# Patient Record
Sex: MALE | Race: AMERICAN INDIAN OR ALASKA NATIVE | Employment: FULL TIME | URBAN - METROPOLITAN AREA
[De-identification: names, ages, dates, MRNs, and addresses within clinical notes are randomized per-mention and may not be internally consistent; named-entity substitution may affect disease eponyms.]

---

## 2017-01-09 LAB — INR: 2.9 (ref 0.8–1.2)

## 2017-01-10 ENCOUNTER — ANTI-COAG VISIT (OUTPATIENT)
Dept: HEMATOLOGY/ONCOLOGY | Facility: HOSPITAL | Age: 62
End: 2017-01-10

## 2017-01-10 DIAGNOSIS — Z79.01 LONG TERM (CURRENT) USE OF ANTICOAGULANTS: Primary | ICD-10-CM

## 2017-01-17 ENCOUNTER — ANTI-COAG VISIT (OUTPATIENT)
Dept: HEMATOLOGY/ONCOLOGY | Facility: HOSPITAL | Age: 62
End: 2017-01-17

## 2017-01-17 DIAGNOSIS — Z79.01 LONG TERM (CURRENT) USE OF ANTICOAGULANTS: Primary | ICD-10-CM

## 2017-01-17 LAB — INR: 2.8 (ref 0.8–1.2)

## 2017-01-30 ENCOUNTER — ANTI-COAG VISIT (OUTPATIENT)
Dept: HEMATOLOGY/ONCOLOGY | Facility: HOSPITAL | Age: 62
End: 2017-01-30

## 2017-01-30 DIAGNOSIS — Z79.01 LONG TERM (CURRENT) USE OF ANTICOAGULANTS: Primary | ICD-10-CM

## 2017-01-30 LAB — INR: 2.6 (ref 0.8–1.2)

## 2017-02-13 ENCOUNTER — ANTI-COAG VISIT (OUTPATIENT)
Dept: HEMATOLOGY/ONCOLOGY | Facility: HOSPITAL | Age: 62
End: 2017-02-13
Payer: COMMERCIAL

## 2017-02-13 DIAGNOSIS — I26.99 BILATERAL PULMONARY EMBOLISM (HCC): ICD-10-CM

## 2017-02-13 DIAGNOSIS — Z79.01 LONG TERM (CURRENT) USE OF ANTICOAGULANTS: Primary | ICD-10-CM

## 2017-02-13 LAB — INR: 2.9 (ref 0.8–1.2)

## 2017-02-13 PROCEDURE — 99211 OFF/OP EST MAY X REQ PHY/QHP: CPT

## 2017-02-21 LAB — INR: 2.9 (ref 0.8–1.2)

## 2017-02-22 ENCOUNTER — ANTI-COAG VISIT (OUTPATIENT)
Dept: HEMATOLOGY/ONCOLOGY | Facility: HOSPITAL | Age: 62
End: 2017-02-22

## 2017-02-22 DIAGNOSIS — Z79.01 LONG TERM (CURRENT) USE OF ANTICOAGULANTS: Primary | ICD-10-CM

## 2017-02-27 LAB — INR: 2.8 (ref 0.8–1.2)

## 2017-03-03 ENCOUNTER — ANTI-COAG VISIT (OUTPATIENT)
Dept: HEMATOLOGY/ONCOLOGY | Facility: HOSPITAL | Age: 62
End: 2017-03-03

## 2017-03-03 DIAGNOSIS — Z79.01 LONG TERM (CURRENT) USE OF ANTICOAGULANTS: Primary | ICD-10-CM

## 2017-03-06 ENCOUNTER — ANTI-COAG VISIT (OUTPATIENT)
Dept: HEMATOLOGY/ONCOLOGY | Facility: HOSPITAL | Age: 62
End: 2017-03-06

## 2017-03-06 DIAGNOSIS — Z79.01 LONG TERM (CURRENT) USE OF ANTICOAGULANTS: Primary | ICD-10-CM

## 2017-03-06 LAB — INR: 2.6 (ref 0.8–1.2)

## 2017-03-13 ENCOUNTER — ANTI-COAG VISIT (OUTPATIENT)
Dept: HEMATOLOGY/ONCOLOGY | Facility: HOSPITAL | Age: 62
End: 2017-03-13

## 2017-03-13 DIAGNOSIS — Z79.01 LONG TERM (CURRENT) USE OF ANTICOAGULANTS: Primary | ICD-10-CM

## 2017-03-13 LAB — INR: 2.8 (ref 0.8–1.2)

## 2017-03-17 ENCOUNTER — OFFICE VISIT (OUTPATIENT)
Dept: FAMILY MEDICINE CLINIC | Facility: CLINIC | Age: 62
End: 2017-03-17

## 2017-03-17 ENCOUNTER — LAB ENCOUNTER (OUTPATIENT)
Dept: LAB | Age: 62
End: 2017-03-17
Attending: FAMILY MEDICINE
Payer: COMMERCIAL

## 2017-03-17 VITALS
RESPIRATION RATE: 20 BRPM | BODY MASS INDEX: 37.51 KG/M2 | HEART RATE: 84 BPM | TEMPERATURE: 99 F | SYSTOLIC BLOOD PRESSURE: 122 MMHG | HEIGHT: 70 IN | WEIGHT: 262 LBS | DIASTOLIC BLOOD PRESSURE: 80 MMHG

## 2017-03-17 DIAGNOSIS — N20.0 KIDNEY STONES: ICD-10-CM

## 2017-03-17 DIAGNOSIS — Z12.11 COLON CANCER SCREENING: ICD-10-CM

## 2017-03-17 DIAGNOSIS — G47.33 OSA ON CPAP: ICD-10-CM

## 2017-03-17 DIAGNOSIS — Z00.00 ANNUAL PHYSICAL EXAM: ICD-10-CM

## 2017-03-17 DIAGNOSIS — Z15.89 MTHFR MUTATION: ICD-10-CM

## 2017-03-17 DIAGNOSIS — Z99.89 OSA ON CPAP: ICD-10-CM

## 2017-03-17 DIAGNOSIS — K42.0 UMBILICAL HERNIA WITH OBSTRUCTION, WITHOUT GANGRENE: ICD-10-CM

## 2017-03-17 DIAGNOSIS — K57.30 DIVERTICULOSIS OF LARGE INTESTINE WITHOUT HEMORRHAGE: ICD-10-CM

## 2017-03-17 DIAGNOSIS — Z00.00 ANNUAL PHYSICAL EXAM: Primary | ICD-10-CM

## 2017-03-17 DIAGNOSIS — I26.99 RECURRENT PULMONARY EMBOLISM (HCC): ICD-10-CM

## 2017-03-17 DIAGNOSIS — N40.0 BENIGN NON-NODULAR PROSTATIC HYPERPLASIA, PRESENCE OF LOWER URINARY TRACT SYMPTOMS UNSPECIFIED: ICD-10-CM

## 2017-03-17 DIAGNOSIS — R19.09 GROIN MASS: ICD-10-CM

## 2017-03-17 LAB
25-HYDROXYVITAMIN D (TOTAL): 23.4 NG/ML (ref 30–100)
ALBUMIN SERPL-MCNC: 4 G/DL (ref 3.5–4.8)
ALP LIVER SERPL-CCNC: 102 U/L (ref 45–117)
ALT SERPL-CCNC: 95 U/L (ref 17–63)
AST SERPL-CCNC: 37 U/L (ref 15–41)
BASOPHILS # BLD AUTO: 0.05 X10(3) UL (ref 0–0.1)
BASOPHILS NFR BLD AUTO: 1 %
BILIRUB SERPL-MCNC: 1.5 MG/DL (ref 0.1–2)
BUN BLD-MCNC: 13 MG/DL (ref 8–20)
CALCIUM BLD-MCNC: 10 MG/DL (ref 8.3–10.3)
CHLORIDE: 105 MMOL/L (ref 101–111)
CHOLEST SMN-MCNC: 204 MG/DL (ref ?–200)
CO2: 28 MMOL/L (ref 22–32)
CREAT BLD-MCNC: 1.19 MG/DL (ref 0.7–1.3)
EOSINOPHIL # BLD AUTO: 0.33 X10(3) UL (ref 0–0.3)
EOSINOPHIL NFR BLD AUTO: 6.5 %
ERYTHROCYTE [DISTWIDTH] IN BLOOD BY AUTOMATED COUNT: 15 % (ref 11.5–16)
FREE T4: 1.1 NG/DL (ref 0.9–1.8)
GLUCOSE BLD-MCNC: 99 MG/DL (ref 70–99)
HAV AB SERPL IA-ACNC: 656 PG/ML (ref 193–986)
HAV IGM SER QL: 2.2 MG/DL (ref 1.7–3)
HBV SURFACE AB SER QL: NONREACTIVE
HBV SURFACE AB SERPL IA-ACNC: <3.1 MIU/ML
HCT VFR BLD AUTO: 48.1 % (ref 37–53)
HDLC SERPL-MCNC: 43 MG/DL (ref 45–?)
HDLC SERPL: 4.74 {RATIO} (ref ?–4.97)
HGB BLD-MCNC: 16.1 G/DL (ref 13–17)
IMMATURE GRANULOCYTE COUNT: 0.01 X10(3) UL (ref 0–1)
IMMATURE GRANULOCYTE RATIO %: 0.2 %
LDLC SERPL CALC-MCNC: 128 MG/DL (ref ?–130)
LYMPHOCYTES # BLD AUTO: 1.77 X10(3) UL (ref 0.9–4)
LYMPHOCYTES NFR BLD AUTO: 34.8 %
M PROTEIN MFR SERPL ELPH: 8.3 G/DL (ref 6.1–8.3)
MCH RBC QN AUTO: 30.1 PG (ref 27–33.2)
MCHC RBC AUTO-ENTMCNC: 33.5 G/DL (ref 31–37)
MCV RBC AUTO: 89.9 FL (ref 80–99)
MONOCYTES # BLD AUTO: 0.4 X10(3) UL (ref 0.1–0.6)
MONOCYTES NFR BLD AUTO: 7.9 %
NEUTROPHIL ABS PRELIM: 2.52 X10 (3) UL (ref 1.3–6.7)
NEUTROPHILS # BLD AUTO: 2.52 X10(3) UL (ref 1.3–6.7)
NEUTROPHILS NFR BLD AUTO: 49.6 %
NONHDLC SERPL-MCNC: 161 MG/DL (ref ?–130)
PLATELET # BLD AUTO: 100 10(3)UL (ref 150–450)
POTASSIUM SERPL-SCNC: 4.6 MMOL/L (ref 3.6–5.1)
PSA SERPL-MCNC: 2.98 NG/ML (ref 0.01–4)
RBC # BLD AUTO: 5.35 X10(6)UL (ref 4.3–5.7)
RED CELL DISTRIBUTION WIDTH-SD: 49 FL (ref 35.1–46.3)
SODIUM SERPL-SCNC: 140 MMOL/L (ref 136–144)
TRIGLYCERIDES: 167 MG/DL (ref ?–150)
TSI SER-ACNC: 1.21 MIU/ML (ref 0.35–5.5)
VLDL: 33 MG/DL (ref 5–40)
WBC # BLD AUTO: 5.1 X10(3) UL (ref 4–13)

## 2017-03-17 PROCEDURE — 82607 VITAMIN B-12: CPT

## 2017-03-17 PROCEDURE — 99386 PREV VISIT NEW AGE 40-64: CPT | Performed by: FAMILY MEDICINE

## 2017-03-17 PROCEDURE — 84443 ASSAY THYROID STIM HORMONE: CPT

## 2017-03-17 PROCEDURE — 80053 COMPREHEN METABOLIC PANEL: CPT

## 2017-03-17 PROCEDURE — 80061 LIPID PANEL: CPT

## 2017-03-17 PROCEDURE — 82306 VITAMIN D 25 HYDROXY: CPT

## 2017-03-17 PROCEDURE — 36415 COLL VENOUS BLD VENIPUNCTURE: CPT

## 2017-03-17 PROCEDURE — 83735 ASSAY OF MAGNESIUM: CPT

## 2017-03-17 PROCEDURE — 85025 COMPLETE CBC W/AUTO DIFF WBC: CPT

## 2017-03-17 PROCEDURE — 86706 HEP B SURFACE ANTIBODY: CPT

## 2017-03-17 PROCEDURE — 84439 ASSAY OF FREE THYROXINE: CPT

## 2017-03-17 PROCEDURE — 84153 ASSAY OF PSA TOTAL: CPT

## 2017-03-17 RX ORDER — TAMSULOSIN HYDROCHLORIDE 0.4 MG/1
0.4 CAPSULE ORAL DAILY
Qty: 90 CAPSULE | Refills: 0 | Status: SHIPPED | OUTPATIENT
Start: 2017-03-17 | End: 2017-06-13

## 2017-03-17 NOTE — PROGRESS NOTES
Melissa Moura is a 65year old male who presents for a complete physical exam.   HPI:   Pt complains of nothing.     Recurrent pulmonary embolism    + calcium and vit D   + nocturia urinary symptoms  No erectile dysfunction  No penile discharge  + LEANDRA on lesions  EYES:denies blurred vision or double visio  HEENT: denies nasal congestion, sinus pain or ST  LUNGS: denies shortness of breath with exertion  CARDIOVASCULAR: denies chest pain on exertion  GI: denies abdominal pain,denies heartburn  : denies no Future    2. Kidney stones  Push hydration     3. Diverticulosis of large intestine without hemorrhage  Due for repeat c-scope     4. Recurrent pulmonary embolism (HCC)  Sees heme     5. MTHFR mutation ([address]  On coumadin     6. LEANDRA on CPAP      7.  Colon ca

## 2017-03-20 DIAGNOSIS — E56.9 VITAMIN DEFICIENCY: Primary | ICD-10-CM

## 2017-03-20 DIAGNOSIS — R79.89 ABNORMAL CBC: ICD-10-CM

## 2017-03-20 DIAGNOSIS — Z23 NEED FOR HEPATITIS VACCINATION: ICD-10-CM

## 2017-03-20 DIAGNOSIS — R74.8 ELEVATED LIVER ENZYMES: ICD-10-CM

## 2017-03-20 DIAGNOSIS — Z23 NEED FOR HEPATITIS VACCINATION: Primary | ICD-10-CM

## 2017-03-20 DIAGNOSIS — E78.00 ELEVATED CHOLESTEROL: ICD-10-CM

## 2017-03-20 LAB — INR: 2.4 (ref 0.8–1.2)

## 2017-03-21 ENCOUNTER — ANTI-COAG VISIT (OUTPATIENT)
Dept: HEMATOLOGY/ONCOLOGY | Facility: HOSPITAL | Age: 62
End: 2017-03-21

## 2017-03-21 DIAGNOSIS — Z79.01 LONG TERM (CURRENT) USE OF ANTICOAGULANTS: Primary | ICD-10-CM

## 2017-03-24 ENCOUNTER — TELEPHONE (OUTPATIENT)
Dept: FAMILY MEDICINE CLINIC | Facility: CLINIC | Age: 62
End: 2017-03-24

## 2017-03-24 ENCOUNTER — NURSE ONLY (OUTPATIENT)
Dept: FAMILY MEDICINE CLINIC | Facility: CLINIC | Age: 62
End: 2017-03-24

## 2017-03-24 ENCOUNTER — HOSPITAL ENCOUNTER (OUTPATIENT)
Dept: ULTRASOUND IMAGING | Age: 62
Discharge: HOME | End: 2017-03-24
Attending: FAMILY MEDICINE
Payer: COMMERCIAL

## 2017-03-24 DIAGNOSIS — Z23 NEED FOR HEPATITIS B VACCINATION: Primary | ICD-10-CM

## 2017-03-24 DIAGNOSIS — R19.09 GROIN MASS: ICD-10-CM

## 2017-03-24 PROCEDURE — 90746 HEPB VACCINE 3 DOSE ADULT IM: CPT | Performed by: FAMILY MEDICINE

## 2017-03-24 PROCEDURE — 76882 US LMTD JT/FCL EVL NVASC XTR: CPT

## 2017-03-24 PROCEDURE — 90471 IMMUNIZATION ADMIN: CPT | Performed by: FAMILY MEDICINE

## 2017-03-27 ENCOUNTER — ANTI-COAG VISIT (OUTPATIENT)
Dept: HEMATOLOGY/ONCOLOGY | Facility: HOSPITAL | Age: 62
End: 2017-03-27

## 2017-03-27 DIAGNOSIS — Z79.01 LONG TERM (CURRENT) USE OF ANTICOAGULANTS: Primary | ICD-10-CM

## 2017-03-27 LAB — INR: 2.7 (ref 0.8–1.2)

## 2017-04-03 ENCOUNTER — OFFICE VISIT (OUTPATIENT)
Dept: SURGERY | Facility: CLINIC | Age: 62
End: 2017-04-03

## 2017-04-03 VITALS
WEIGHT: 260 LBS | DIASTOLIC BLOOD PRESSURE: 95 MMHG | BODY MASS INDEX: 37.22 KG/M2 | HEIGHT: 70 IN | HEART RATE: 76 BPM | SYSTOLIC BLOOD PRESSURE: 149 MMHG

## 2017-04-03 DIAGNOSIS — K42.9 UMBILICAL HERNIA WITHOUT OBSTRUCTION AND WITHOUT GANGRENE: Primary | ICD-10-CM

## 2017-04-03 DIAGNOSIS — R19.09 MASS OF LEFT INGUINAL REGION: ICD-10-CM

## 2017-04-03 PROCEDURE — 99244 OFF/OP CNSLTJ NEW/EST MOD 40: CPT | Performed by: COLON & RECTAL SURGERY

## 2017-04-03 RX ORDER — POLYETHYLENE GLYCOL 3350, SODIUM CHLORIDE, SODIUM BICARBONATE, POTASSIUM CHLORIDE 420; 11.2; 5.72; 1.48 G/4L; G/4L; G/4L; G/4L
POWDER, FOR SOLUTION ORAL
Qty: 1 BOTTLE | Refills: 0 | Status: SHIPPED | OUTPATIENT
Start: 2017-04-03 | End: 2017-06-12 | Stop reason: ALTCHOICE

## 2017-04-03 NOTE — PATIENT INSTRUCTIONS
The patient is a pleasant 81-year-old male who presents in consultation for a screening colonoscopy as well as a left inguinal mass and umbilical mass. The patient is referred to me by his primary provider, Dr. Andrey Lee.     The patient's last colonoscopy w office. He lives in [place] with his wife. He has no known family history of colon polyps, colon cancer, or uterine cancer. He does have a positive MTHFR gene mutation. Physical exam: The abdomen is soft, nondistended, and nontender.   No rebound   Open Hard Copy Result Report (Order #[id number] - CT ABDOMEN+PELVIS(CPT=74176))       Result Information      Status Provider Status        Final result (8/23/2016 11:59 AM) Reviewed        Study Result         CT ABDOMEN PELVIS WITHOUT CONTRAST     HIS Bilateral nephrolithiasis. 3. Small fat-containing periumbilical hernia with induration of the herniated adipose induration of  the adipose of the cyst of the hernia.  The adipose induration may be a sign of mesenteric  inflammation but please correlate cl

## 2017-04-03 NOTE — H&P
New Patient Visit Note       Active Problems      1. Umbilical hernia without obstruction and without gangrene    2.  Mass of left inguinal region        Chief Complaint   Patient presents with:  Colonoscopy: New patient referred by Dr. Sarah Mcgowan for colonosc patient is referred to me by his primary provider, Dr. Woodrow Sarkar. The patient's last colonoscopy was performed at [facility] in 2007. This was his first colonoscopy. He reports 1 or 2 hyperplastic polyps were removed at that time.   He has not developed any sy MTHFR gene mutation. Allergies  Noemí Rodriguez has No Known Allergies. Past Medical / Surgical / Social / Family History    The past medical and past surgical history have been reviewed by me today.     Past Medical History   Diagnosis Date   • PULMONARY E HENT: Negative for hearing loss, nosebleeds, sore throat and trouble swallowing. Respiratory: Negative for apnea, cough, shortness of breath and wheezing. Cardiovascular: Negative for chest pain, palpitations and leg swelling.    Gastrointestinal: N and oriented to person, place, and time. Skin: Skin is warm and dry. He is not diaphoretic. No erythema. Psychiatric: He has a normal mood and affect.  His behavior is normal. Judgment and thought content normal.           Results    CT ABDOMEN+PELVIS(C just  outside of the hernia. This finding can be acute or chronic. The hernia neck measures 10 mm.     The appendix is normal. Descending colon and sigmoid diverticula are observed with no findings of  active inflammation.  Prostate gland diameter is 5.0 cm        =====  CONCLUSION:  Fat-containing left inguinal hernia.            Dictated by: Prachi Calles MD on 3/24/2017 at 13:09        Approved by: Prachi Calles MD               Assessment   Umbilical hernia without obstruction and without gangrene  (cuong significant for an ACL repair as well as the above-mentioned bone marrow biopsy. No other surgery, including abdominal surgery. Medication list is significant for Coumadin as noted above. He has no known allergies. He has no smoking history.   He has discussed with the patient. All questions from the patient were answered in detail. A description of the procedure and its possible outcomes was fully discussed. The patient seemed to understand the conversation and its details.  Consent for surgery was con

## 2017-04-07 ENCOUNTER — ANTI-COAG VISIT (OUTPATIENT)
Dept: HEMATOLOGY/ONCOLOGY | Facility: HOSPITAL | Age: 62
End: 2017-04-07

## 2017-04-07 DIAGNOSIS — Z79.01 LONG TERM (CURRENT) USE OF ANTICOAGULANTS: Primary | ICD-10-CM

## 2017-04-10 ENCOUNTER — ANTI-COAG VISIT (OUTPATIENT)
Dept: HEMATOLOGY/ONCOLOGY | Facility: HOSPITAL | Age: 62
End: 2017-04-10

## 2017-04-10 DIAGNOSIS — Z79.01 LONG TERM (CURRENT) USE OF ANTICOAGULANTS: Primary | ICD-10-CM

## 2017-04-18 ENCOUNTER — ANTI-COAG VISIT (OUTPATIENT)
Dept: HEMATOLOGY/ONCOLOGY | Facility: HOSPITAL | Age: 62
End: 2017-04-18

## 2017-04-18 DIAGNOSIS — Z79.01 LONG TERM (CURRENT) USE OF ANTICOAGULANTS: Primary | ICD-10-CM

## 2017-04-20 RX ORDER — WARFARIN SODIUM 5 MG/1
TABLET ORAL
Qty: 180 TABLET | Refills: 0 | Status: SHIPPED | OUTPATIENT
Start: 2017-04-20 | End: 2018-05-30

## 2017-04-24 ENCOUNTER — ANTI-COAG VISIT (OUTPATIENT)
Dept: HEMATOLOGY/ONCOLOGY | Facility: HOSPITAL | Age: 62
End: 2017-04-24

## 2017-04-24 DIAGNOSIS — Z79.01 LONG TERM (CURRENT) USE OF ANTICOAGULANTS: Primary | ICD-10-CM

## 2017-04-25 PROBLEM — R19.09 MASS OF LEFT INGUINAL REGION: Status: ACTIVE | Noted: 2017-04-25

## 2017-04-25 PROBLEM — K42.9 UMBILICAL HERNIA WITHOUT OBSTRUCTION AND WITHOUT GANGRENE: Status: ACTIVE | Noted: 2017-04-25

## 2017-05-01 ENCOUNTER — ANTI-COAG VISIT (OUTPATIENT)
Dept: HEMATOLOGY/ONCOLOGY | Facility: HOSPITAL | Age: 62
End: 2017-05-01

## 2017-05-01 DIAGNOSIS — Z79.01 LONG TERM (CURRENT) USE OF ANTICOAGULANTS: Primary | ICD-10-CM

## 2017-05-09 ENCOUNTER — ANTI-COAG VISIT (OUTPATIENT)
Dept: HEMATOLOGY/ONCOLOGY | Facility: HOSPITAL | Age: 62
End: 2017-05-09

## 2017-05-09 DIAGNOSIS — Z79.01 LONG TERM (CURRENT) USE OF ANTICOAGULANTS: Primary | ICD-10-CM

## 2017-05-12 ENCOUNTER — NURSE ONLY (OUTPATIENT)
Dept: FAMILY MEDICINE CLINIC | Facility: CLINIC | Age: 62
End: 2017-05-12

## 2017-05-12 ENCOUNTER — OFFICE VISIT (OUTPATIENT)
Dept: HEMATOLOGY/ONCOLOGY | Facility: HOSPITAL | Age: 62
End: 2017-05-12
Attending: SPECIALIST
Payer: COMMERCIAL

## 2017-05-12 VITALS
DIASTOLIC BLOOD PRESSURE: 94 MMHG | TEMPERATURE: 97 F | WEIGHT: 264.81 LBS | HEART RATE: 78 BPM | SYSTOLIC BLOOD PRESSURE: 156 MMHG | BODY MASS INDEX: 37.91 KG/M2 | HEIGHT: 70 IN | RESPIRATION RATE: 20 BRPM | OXYGEN SATURATION: 96 %

## 2017-05-12 DIAGNOSIS — Z23 NEED FOR HEPATITIS B VACCINATION: Primary | ICD-10-CM

## 2017-05-12 DIAGNOSIS — I26.99 RECURRENT PULMONARY EMBOLISM (HCC): ICD-10-CM

## 2017-05-12 DIAGNOSIS — Z79.01 LONG TERM (CURRENT) USE OF ANTICOAGULANTS: Primary | ICD-10-CM

## 2017-05-12 DIAGNOSIS — D69.6 THROMBOCYTOPENIA (HCC): ICD-10-CM

## 2017-05-12 DIAGNOSIS — D75.1 POLYCYTHEMIA: ICD-10-CM

## 2017-05-12 PROCEDURE — 90746 HEPB VACCINE 3 DOSE ADULT IM: CPT | Performed by: FAMILY MEDICINE

## 2017-05-12 PROCEDURE — 90471 IMMUNIZATION ADMIN: CPT | Performed by: FAMILY MEDICINE

## 2017-05-12 PROCEDURE — 99214 OFFICE O/P EST MOD 30 MIN: CPT | Performed by: SPECIALIST

## 2017-05-12 RX ORDER — TAMSULOSIN HYDROCHLORIDE 0.4 MG/1
0.4 CAPSULE ORAL DAILY
COMMUNITY
End: 2017-06-13

## 2017-05-12 NOTE — PROGRESS NOTES
Patient is here today for follow up with Lila Millard for Hx PE and anticoagulation therapy. Patient denies pain. Is on Warfarin therapy - INR's within therapeutic range. Medication list and medical history were reviewed and updated.    Patient is having colo

## 2017-05-13 NOTE — PROGRESS NOTES
[facility] Progress Note      Patient Name: Therese Mcwilliams   YOB: 1955  Medical Record Number: [id number]  Attending Physician: Елена Lloyd M.D.      Date of Visit: 5/12/2017      Chief Complaint  Pulmonary embolism, polycythe hematopoiesis including megakaryopoiesis no significant increase in reticulin fibrosis.   Cytogenetics were normal.    On 11/21/2012 patient presented to the hospital with new shortness of breath and was found to have bilateral pulmonary embolism and left l 81 MG Oral Tab Take 81 mg by mouth daily. Disp:  Rfl:      Allergies   Mr. Pako Bergeron has No Known Allergies. Review of Systems   Constitutional No fevers, chills, night sweats, excessive fatigue or weight loss.   Hematologic/Lymphatic No tender or enlarg (A) 0.8 - 1.2   -PROTHROMBIN TIME - EXTERNAL RESULTS   Collection Time: 03/13/17 12:00 AM   Result Value Ref Range   INR 2.8 (A) 0.8 - 1.2   -FREE T4 (FREE THYROXINE)   Collection Time: 03/17/17 11:44 AM   Result Value Ref Range   Free T4 1.1 0.9-1.8 ng/dL 4. 0-13.0 x10(3) uL   RBC 5.35 4.30-5.70 x10(6)uL   HGB 16.1 13.0-17.0 g/dL   HCT 48.1 37.0-53.0 %   .0 (L) 150.0-450.0 10(3)uL   MCV 89.9 80.0-99.0 fL   MCH 30.1 27.0-33.2 pg   MCHC 33.5 31.0-37.0 g/dL   RDW 15.0 11.5-16.0 %   RDW-SD 49.0 (H) 35.1-4 CPAP use consistent with secondary polycythemia. 3.   Thrombocytopenia: Chronic and mild and unchanging. 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 Bone marrow biopsy unremarkable. Planned Follow Up   Patient will follow up annually and PRN.     Electronically melissa

## 2017-05-16 ENCOUNTER — ANTI-COAG VISIT (OUTPATIENT)
Dept: HEMATOLOGY/ONCOLOGY | Facility: HOSPITAL | Age: 62
End: 2017-05-16

## 2017-05-16 DIAGNOSIS — I26.99 RECURRENT PULMONARY EMBOLISM (HCC): ICD-10-CM

## 2017-05-16 DIAGNOSIS — Z79.01 LONG TERM (CURRENT) USE OF ANTICOAGULANTS: Primary | ICD-10-CM

## 2017-05-23 ENCOUNTER — ANTI-COAG VISIT (OUTPATIENT)
Dept: HEMATOLOGY/ONCOLOGY | Facility: HOSPITAL | Age: 62
End: 2017-05-23

## 2017-05-23 DIAGNOSIS — Z79.01 LONG TERM (CURRENT) USE OF ANTICOAGULANTS: Primary | ICD-10-CM

## 2017-05-31 ENCOUNTER — ANTI-COAG VISIT (OUTPATIENT)
Dept: HEMATOLOGY/ONCOLOGY | Facility: HOSPITAL | Age: 62
End: 2017-05-31

## 2017-05-31 DIAGNOSIS — Z79.01 LONG TERM (CURRENT) USE OF ANTICOAGULANTS: Primary | ICD-10-CM

## 2017-06-06 ENCOUNTER — ANTI-COAG VISIT (OUTPATIENT)
Dept: HEMATOLOGY/ONCOLOGY | Facility: HOSPITAL | Age: 62
End: 2017-06-06

## 2017-06-06 DIAGNOSIS — Z79.01 LONG TERM (CURRENT) USE OF ANTICOAGULANTS: Primary | ICD-10-CM

## 2017-06-12 ENCOUNTER — TELEPHONE (OUTPATIENT)
Dept: SURGERY | Facility: CLINIC | Age: 62
End: 2017-06-12

## 2017-06-12 NOTE — TELEPHONE ENCOUNTER
Called to inform pt that case was scheduled, reviewed colon prep and will send all information to pt's home. JULIO CÉSAR CANDELARIO sent also.

## 2017-06-13 ENCOUNTER — TELEPHONE (OUTPATIENT)
Dept: FAMILY MEDICINE CLINIC | Facility: CLINIC | Age: 62
End: 2017-06-13

## 2017-06-13 ENCOUNTER — MED REC SCAN ONLY (OUTPATIENT)
Dept: SURGERY | Facility: CLINIC | Age: 62
End: 2017-06-13

## 2017-06-13 DIAGNOSIS — Z01.818 PRE-OP TESTING: Primary | ICD-10-CM

## 2017-06-13 RX ORDER — TAMSULOSIN HYDROCHLORIDE 0.4 MG/1
0.4 CAPSULE ORAL DAILY
Qty: 60 CAPSULE | Refills: 0 | Status: SHIPPED | OUTPATIENT
Start: 2017-06-13 | End: 2017-08-15

## 2017-06-13 RX ORDER — TAMSULOSIN HYDROCHLORIDE 0.4 MG/1
CAPSULE ORAL
Qty: 90 CAPSULE | Refills: 0 | Status: SHIPPED | OUTPATIENT
Start: 2017-06-13 | End: 2017-06-13

## 2017-06-15 ENCOUNTER — LAB ENCOUNTER (OUTPATIENT)
Dept: LAB | Age: 62
End: 2017-06-15
Attending: FAMILY MEDICINE
Payer: COMMERCIAL

## 2017-06-15 DIAGNOSIS — Z01.818 PRE-OP TESTING: ICD-10-CM

## 2017-06-15 PROCEDURE — 85025 COMPLETE CBC W/AUTO DIFF WBC: CPT | Performed by: FAMILY MEDICINE

## 2017-06-15 PROCEDURE — 36415 COLL VENOUS BLD VENIPUNCTURE: CPT | Performed by: FAMILY MEDICINE

## 2017-06-15 PROCEDURE — 80053 COMPREHEN METABOLIC PANEL: CPT | Performed by: FAMILY MEDICINE

## 2017-06-19 ENCOUNTER — APPOINTMENT (OUTPATIENT)
Dept: LAB | Age: 62
End: 2017-06-19
Attending: FAMILY MEDICINE
Payer: COMMERCIAL

## 2017-06-19 ENCOUNTER — ANTI-COAG VISIT (OUTPATIENT)
Dept: HEMATOLOGY/ONCOLOGY | Facility: HOSPITAL | Age: 62
End: 2017-06-19

## 2017-06-19 ENCOUNTER — OFFICE VISIT (OUTPATIENT)
Dept: FAMILY MEDICINE CLINIC | Facility: CLINIC | Age: 62
End: 2017-06-19

## 2017-06-19 VITALS
DIASTOLIC BLOOD PRESSURE: 88 MMHG | BODY MASS INDEX: 37.8 KG/M2 | TEMPERATURE: 99 F | RESPIRATION RATE: 18 BRPM | OXYGEN SATURATION: 98 % | SYSTOLIC BLOOD PRESSURE: 130 MMHG | HEART RATE: 71 BPM | HEIGHT: 70 IN | WEIGHT: 264 LBS

## 2017-06-19 DIAGNOSIS — M79.89 FOOT SWELLING: Primary | ICD-10-CM

## 2017-06-19 DIAGNOSIS — L03.90 CELLULITIS, UNSPECIFIED CELLULITIS SITE: ICD-10-CM

## 2017-06-19 DIAGNOSIS — M79.89 FOOT SWELLING: ICD-10-CM

## 2017-06-19 DIAGNOSIS — R74.8 ELEVATED LIVER ENZYMES: ICD-10-CM

## 2017-06-19 DIAGNOSIS — E56.9 VITAMIN DEFICIENCY: ICD-10-CM

## 2017-06-19 DIAGNOSIS — E78.00 ELEVATED CHOLESTEROL: ICD-10-CM

## 2017-06-19 DIAGNOSIS — Z79.01 LONG TERM (CURRENT) USE OF ANTICOAGULANTS: Primary | ICD-10-CM

## 2017-06-19 PROCEDURE — 99213 OFFICE O/P EST LOW 20 MIN: CPT | Performed by: FAMILY MEDICINE

## 2017-06-19 PROCEDURE — 82306 VITAMIN D 25 HYDROXY: CPT | Performed by: FAMILY MEDICINE

## 2017-06-19 PROCEDURE — 84550 ASSAY OF BLOOD/URIC ACID: CPT | Performed by: FAMILY MEDICINE

## 2017-06-19 PROCEDURE — 36415 COLL VENOUS BLD VENIPUNCTURE: CPT | Performed by: FAMILY MEDICINE

## 2017-06-19 PROCEDURE — 80061 LIPID PANEL: CPT | Performed by: FAMILY MEDICINE

## 2017-06-19 RX ORDER — CEPHALEXIN 500 MG/1
500 CAPSULE ORAL 4 TIMES DAILY
Qty: 40 CAPSULE | Refills: 0 | Status: SHIPPED | OUTPATIENT
Start: 2017-06-19 | End: 2017-10-09 | Stop reason: ALTCHOICE

## 2017-06-19 NOTE — PROGRESS NOTES
HPI:    Patient ID: Solomon Saunders is a 65year old male. HPI Comments: Red streak from midfoot to just above the ant ankle. Tenderness and warmth along this streak. Insect Bite  This is a new problem. Episode onset: 2 days ago.  The problem has bee are normal. Right eye exhibits no discharge. Left eye exhibits no discharge. No scleral icterus. Cardiovascular: Normal rate, regular rhythm and normal heart sounds. Pulmonary/Chest: Effort normal and breath sounds normal. No respiratory distress.  He

## 2017-06-22 ENCOUNTER — OFFICE VISIT (OUTPATIENT)
Dept: FAMILY MEDICINE CLINIC | Facility: CLINIC | Age: 62
End: 2017-06-22

## 2017-06-22 VITALS
DIASTOLIC BLOOD PRESSURE: 92 MMHG | TEMPERATURE: 98 F | HEART RATE: 79 BPM | HEIGHT: 70 IN | OXYGEN SATURATION: 94 % | RESPIRATION RATE: 18 BRPM | SYSTOLIC BLOOD PRESSURE: 150 MMHG | WEIGHT: 264 LBS | BODY MASS INDEX: 37.8 KG/M2

## 2017-06-22 DIAGNOSIS — M10.072 ACUTE IDIOPATHIC GOUT INVOLVING TOE OF LEFT FOOT: ICD-10-CM

## 2017-06-22 DIAGNOSIS — Z00.00 ROUTINE GENERAL MEDICAL EXAMINATION AT A HEALTH CARE FACILITY: Primary | ICD-10-CM

## 2017-06-22 PROCEDURE — 99213 OFFICE O/P EST LOW 20 MIN: CPT | Performed by: FAMILY MEDICINE

## 2017-06-22 RX ORDER — METHYLPREDNISOLONE 4 MG/1
TABLET ORAL
Qty: 1 PACKAGE | Refills: 0 | Status: SHIPPED | OUTPATIENT
Start: 2017-06-22 | End: 2017-10-09 | Stop reason: ALTCHOICE

## 2017-06-22 NOTE — H&P
Revisit after initial evaluation several days ago by Dr. Jose Mota. Diagnosis is left great toe gout. He has had it on the opposite side in the past.  Is also had a history of kidney stones but they apparently were of the calcium oxalate variety.   Dr. Jose Mota

## 2017-06-23 ENCOUNTER — ANESTHESIA (OUTPATIENT)
Dept: SURGERY | Facility: HOSPITAL | Age: 62
End: 2017-06-23

## 2017-06-23 ENCOUNTER — HOSPITAL ENCOUNTER (OUTPATIENT)
Facility: HOSPITAL | Age: 62
Setting detail: HOSPITAL OUTPATIENT SURGERY
Discharge: HOME | End: 2017-06-23
Attending: COLON & RECTAL SURGERY | Admitting: COLON & RECTAL SURGERY
Payer: COMMERCIAL

## 2017-06-23 ENCOUNTER — ANTI-COAG VISIT (OUTPATIENT)
Dept: HEMATOLOGY/ONCOLOGY | Facility: HOSPITAL | Age: 62
End: 2017-06-23

## 2017-06-23 ENCOUNTER — SURGERY (OUTPATIENT)
Age: 62
End: 2017-06-23

## 2017-06-23 ENCOUNTER — ANESTHESIA EVENT (OUTPATIENT)
Dept: SURGERY | Facility: HOSPITAL | Age: 62
End: 2017-06-23

## 2017-06-23 VITALS
SYSTOLIC BLOOD PRESSURE: 146 MMHG | HEIGHT: 70 IN | BODY MASS INDEX: 37.11 KG/M2 | TEMPERATURE: 98 F | WEIGHT: 259.25 LBS | HEART RATE: 70 BPM | DIASTOLIC BLOOD PRESSURE: 85 MMHG | OXYGEN SATURATION: 98 % | RESPIRATION RATE: 18 BRPM

## 2017-06-23 DIAGNOSIS — C21.1 MALIGNANT NEOPLASM OF ANAL CANAL (HCC): ICD-10-CM

## 2017-06-23 DIAGNOSIS — Z79.01 LONG TERM (CURRENT) USE OF ANTICOAGULANTS: Primary | ICD-10-CM

## 2017-06-23 PROCEDURE — 0DBQ7ZZ EXCISION OF ANUS, VIA NATURAL OR ARTIFICIAL OPENING: ICD-10-PCS | Performed by: COLON & RECTAL SURGERY

## 2017-06-23 PROCEDURE — 06BY0ZC EXCISION OF HEMORRHOIDAL PLEXUS, OPEN APPROACH: ICD-10-PCS | Performed by: COLON & RECTAL SURGERY

## 2017-06-23 PROCEDURE — 85610 PROTHROMBIN TIME: CPT

## 2017-06-23 PROCEDURE — 88304 TISSUE EXAM BY PATHOLOGIST: CPT | Performed by: COLON & RECTAL SURGERY

## 2017-06-23 PROCEDURE — 88305 TISSUE EXAM BY PATHOLOGIST: CPT | Performed by: COLON & RECTAL SURGERY

## 2017-06-23 PROCEDURE — 0DJD8ZZ INSPECTION OF LOWER INTESTINAL TRACT, VIA NATURAL OR ARTIFICIAL OPENING ENDOSCOPIC: ICD-10-PCS | Performed by: COLON & RECTAL SURGERY

## 2017-06-23 RX ORDER — DIPHENHYDRAMINE HYDROCHLORIDE 50 MG/ML
12.5 INJECTION INTRAMUSCULAR; INTRAVENOUS AS NEEDED
Status: DISCONTINUED | OUTPATIENT
Start: 2017-06-23 | End: 2017-06-23

## 2017-06-23 RX ORDER — NALOXONE HYDROCHLORIDE 0.4 MG/ML
80 INJECTION, SOLUTION INTRAMUSCULAR; INTRAVENOUS; SUBCUTANEOUS AS NEEDED
Status: DISCONTINUED | OUTPATIENT
Start: 2017-06-23 | End: 2017-06-23

## 2017-06-23 RX ORDER — HEPARIN SODIUM 5000 [USP'U]/ML
5000 INJECTION, SOLUTION INTRAVENOUS; SUBCUTANEOUS ONCE
Status: COMPLETED | OUTPATIENT
Start: 2017-06-23 | End: 2017-06-23

## 2017-06-23 RX ORDER — SODIUM CHLORIDE, SODIUM LACTATE, POTASSIUM CHLORIDE, CALCIUM CHLORIDE 600; 310; 30; 20 MG/100ML; MG/100ML; MG/100ML; MG/100ML
INJECTION, SOLUTION INTRAVENOUS CONTINUOUS
Status: DISCONTINUED | OUTPATIENT
Start: 2017-06-23 | End: 2017-06-23

## 2017-06-23 RX ORDER — HYDROCODONE BITARTRATE AND ACETAMINOPHEN 5; 325 MG/1; MG/1
1-2 TABLET ORAL
Qty: 40 TABLET | Refills: 0 | Status: ON HOLD | OUTPATIENT
Start: 2017-06-23 | End: 2017-07-13

## 2017-06-23 RX ORDER — ONDANSETRON 2 MG/ML
4 INJECTION INTRAMUSCULAR; INTRAVENOUS AS NEEDED
Status: DISCONTINUED | OUTPATIENT
Start: 2017-06-23 | End: 2017-06-23

## 2017-06-23 RX ORDER — DEXAMETHASONE SODIUM PHOSPHATE 4 MG/ML
4 VIAL (ML) INJECTION AS NEEDED
Status: DISCONTINUED | OUTPATIENT
Start: 2017-06-23 | End: 2017-06-23

## 2017-06-23 RX ORDER — HYDROCODONE BITARTRATE AND ACETAMINOPHEN 5; 325 MG/1; MG/1
2 TABLET ORAL AS NEEDED
Status: DISCONTINUED | OUTPATIENT
Start: 2017-06-23 | End: 2017-06-23

## 2017-06-23 RX ORDER — MEPERIDINE HYDROCHLORIDE 25 MG/ML
12.5 INJECTION INTRAMUSCULAR; INTRAVENOUS; SUBCUTANEOUS AS NEEDED
Status: DISCONTINUED | OUTPATIENT
Start: 2017-06-23 | End: 2017-06-23

## 2017-06-23 RX ORDER — HYDROCODONE BITARTRATE AND ACETAMINOPHEN 5; 325 MG/1; MG/1
1 TABLET ORAL AS NEEDED
Status: DISCONTINUED | OUTPATIENT
Start: 2017-06-23 | End: 2017-06-23

## 2017-06-23 RX ORDER — BUPIVACAINE HYDROCHLORIDE AND EPINEPHRINE 5; 5 MG/ML; UG/ML
INJECTION, SOLUTION EPIDURAL; INTRACAUDAL; PERINEURAL AS NEEDED
Status: DISCONTINUED | OUTPATIENT
Start: 2017-06-23 | End: 2017-06-23 | Stop reason: HOSPADM

## 2017-06-23 RX ORDER — HYDROMORPHONE HYDROCHLORIDE 1 MG/ML
0.4 INJECTION, SOLUTION INTRAMUSCULAR; INTRAVENOUS; SUBCUTANEOUS EVERY 5 MIN PRN
Status: DISCONTINUED | OUTPATIENT
Start: 2017-06-23 | End: 2017-06-23

## 2017-06-23 NOTE — ANESTHESIA POSTPROCEDURE EVALUATION
[address] Patient Status:  Hospital Outpatient Surgery   Age/Gender 65year old male MRN [id number]   [facility] SURGERY Attending Drew Esparza MD   [facility] Day # 0 PCP Stevie Sheriff DO       Anesthesia Post-op Note

## 2017-06-23 NOTE — OPERATIVE REPORT
[facility]  Op Note    Vonda Goodell Location: OR   [facility] [id number] MRN [id number]   Admission Date 6/23/2017 Operation Date 6/23/2017   Attending Physician Anna Barros MD Operating Physician Geovanni Dawson MD     Pre-Operative Diagnosis: Anal canal anterolateral mixed complex prolapsing hemorrhoid. It was necessary to completely resect the enlarged and swollen tissues. The entire region was closed with a running 2-0 Vicryl suture line in a simple continuous fashion.   There will be 2 separate specim patient flare a very large and extreme right anterolateral mixed complex prolapsing hemorrhoid. It was necessary to completely resect the enlarged and swollen tissues. The patient was then controlled for hemostasis within the wound depths.     The res

## 2017-06-23 NOTE — H&P
Active Problems      Anal canal neoplasm    Chief Complaint   Presented after colonoscopy     History of Present Illness     Pat Suazo is a 65year old male. The patient presented for colonoscopy.  The patient underwent the procedure on 6/8/17, a anticoagulation and blood disorder. Other medical history significant for sleep apnea (uses a CPAP), benign prostatic hypertrophy, and borderline high cholesterol.     Past surgical history significant for an ACL repair as well as the above-mentioned bone the night before procedure, drink 8 ounces of the prep every 15-20 minutes until finished  Disp: 1 Bottle  Rfl: 0    tamsulosin HCl (FLOMAX) 0.4 MG Oral Cap  Take 1 capsule (0.4 mg total) by mouth daily.   Disp: 90 capsule  Rfl: 0    Warfarin Sodium (COUMAD Cardiovascular: Normal rate and regular rhythm. No murmur heard. Pulmonary/Chest: Effort normal and breath sounds normal. No respiratory distress. He has no wheezes. He has no rales. Abdominal: Soft.  Bowel sounds are normal. He exhibits no distens history of polycythemia vera. However, he had a bone marrow biopsy done a few years ago as well as some gene testing and was found to not truly have polycythemia vera. He follows with Dr. RANDEE hernandez for management of his anticoagulation and blood disorder. details. Consent for surgery was confirmed with the patient.

## 2017-06-23 NOTE — ANESTHESIA PREPROCEDURE EVALUATION
PRE-OP EVALUATION    Patient Name: Wesley De La Torre    Pre-op Diagnosis: Malignant neoplasm of anal canal ([address] [C21.1]    Procedure(s):  Transanal Excision of Anal Canal Neoplasm     Surgeon(s) and Role:     Diann Barron MD - Primary    Pre-op vitals renal disease and CRI                   Cardiovascular  Comment: [facility]     PATIENT'S NAME:               Abbie Villa  ATTENDING PHYSICIAN:           Katharine Santa MD  ORDERING PHYSICIAN:  MED REC #        [id number]      PATIENT #   O00 reduced right ventricular  systolic function. Jean Claude Lout was a D-shape of the left ventricle, consistent  with increased right ventricular pressure.  There was no pericardial  effusion.  The left atrium was moderately enlarged.  The right atrium was  at least OTHER Left     Comment achilles tendon repair        Smoking status: Light Tobacco Smoker     Types: Cigars    Smokeless tobacco: Not on file    Comment: cigars only from time to time     Alcohol Use: Yes  0.0 oz/week    0 Standard drinks or equivalent per

## 2017-06-28 ENCOUNTER — TELEPHONE (OUTPATIENT)
Dept: SURGERY | Facility: CLINIC | Age: 62
End: 2017-06-28

## 2017-06-28 NOTE — TELEPHONE ENCOUNTER
Pt had transanal excision of neoplasm on 6/23 and is having normal BM's but has a lot of pain with them.  Is not taking NOrco, encouraged to alternate norco with ibuprofen, take sitz baths, try witch hazel or prep H wipes, do not use baby wipes or any ointm

## 2017-07-03 ENCOUNTER — ANTI-COAG VISIT (OUTPATIENT)
Dept: HEMATOLOGY/ONCOLOGY | Facility: HOSPITAL | Age: 62
End: 2017-07-03

## 2017-07-03 DIAGNOSIS — Z79.01 LONG TERM (CURRENT) USE OF ANTICOAGULANTS: ICD-10-CM

## 2017-07-03 LAB — INR: 2.8 (ref 0.8–1.2)

## 2017-07-06 ENCOUNTER — OFFICE VISIT (OUTPATIENT)
Dept: SURGERY | Facility: CLINIC | Age: 62
End: 2017-07-06

## 2017-07-06 VITALS
TEMPERATURE: 98 F | WEIGHT: 250 LBS | HEART RATE: 111 BPM | RESPIRATION RATE: 18 BRPM | DIASTOLIC BLOOD PRESSURE: 91 MMHG | BODY MASS INDEX: 35.79 KG/M2 | SYSTOLIC BLOOD PRESSURE: 133 MMHG | HEIGHT: 70 IN

## 2017-07-06 DIAGNOSIS — D12.9 BENIGN NEOPLASM OF ANAL CANAL: Primary | ICD-10-CM

## 2017-07-06 PROCEDURE — 99024 POSTOP FOLLOW-UP VISIT: CPT | Performed by: COLON & RECTAL SURGERY

## 2017-07-06 NOTE — PROGRESS NOTES
Post Operative Visit Note       Active Problems  1.  Benign neoplasm of anal canal         Chief Complaint   Patient presents with:  Post-Op: 1st PO- Transanal excision of anal canal mass, right anterolateral; excisional hemorrhoidectomy right anterolateral • Sleep apnea 2011    CPAP     Past Surgical History:  2007. 2017: COLONOSCOPY  No date: KNEE REPLACEMENT SURGERY Right  No date: OTHER Right      Comment: acl repair   No date: OTHER Left      Comment: achilles tendon repair    The family history and so me during today. Review of Systems   Constitutional: Negative for chills, diaphoresis, fatigue, fever and unexpected weight change. HENT: Negative for hearing loss, nosebleeds, sore throat and trouble swallowing.     Respiratory: Negative for apnea, coug and performed retroversion of the scope and then clearly identified the lesion once again. The endoscope was reapplied and the lesion again disappeared.   On the second anoscopy identified evidence of a previous deep scar from prior rubber band therapy, an

## 2017-07-07 PROBLEM — K63.5 COLON POLYPS: Status: ACTIVE | Noted: 2017-07-07

## 2017-07-07 PROBLEM — D12.9: Status: ACTIVE | Noted: 2017-07-07

## 2017-07-07 NOTE — PATIENT INSTRUCTIONS
This patient underwent recent colonoscopy and was found to have a neoplasm of the anal canal on retroversion of the scope.     We took him to the operating room and June 23, 2017, at [facility].    The anoscopy did not clearly identify the lesion that

## 2017-07-11 ENCOUNTER — ANTI-COAG VISIT (OUTPATIENT)
Dept: HEMATOLOGY/ONCOLOGY | Facility: HOSPITAL | Age: 62
End: 2017-07-11

## 2017-07-11 DIAGNOSIS — Z79.01 LONG TERM (CURRENT) USE OF ANTICOAGULANTS: ICD-10-CM

## 2017-07-11 LAB — INR: 2.9 (ref 0.8–1.2)

## 2017-07-13 ENCOUNTER — HOSPITAL ENCOUNTER (OUTPATIENT)
Facility: HOSPITAL | Age: 62
Setting detail: OBSERVATION
Discharge: HOME | End: 2017-07-13
Attending: EMERGENCY MEDICINE | Admitting: SURGERY
Payer: COMMERCIAL

## 2017-07-13 ENCOUNTER — ANESTHESIA (OUTPATIENT)
Dept: SURGERY | Facility: HOSPITAL | Age: 62
End: 2017-07-13
Payer: COMMERCIAL

## 2017-07-13 ENCOUNTER — ANESTHESIA EVENT (OUTPATIENT)
Dept: SURGERY | Facility: HOSPITAL | Age: 62
End: 2017-07-13
Payer: COMMERCIAL

## 2017-07-13 ENCOUNTER — SURGERY (OUTPATIENT)
Age: 62
End: 2017-07-13

## 2017-07-13 VITALS
BODY MASS INDEX: 35.79 KG/M2 | DIASTOLIC BLOOD PRESSURE: 98 MMHG | HEIGHT: 70 IN | OXYGEN SATURATION: 100 % | RESPIRATION RATE: 18 BRPM | SYSTOLIC BLOOD PRESSURE: 137 MMHG | HEART RATE: 58 BPM | TEMPERATURE: 98 F | WEIGHT: 250 LBS

## 2017-07-13 DIAGNOSIS — K62.5 RECTAL BLEEDING: Primary | ICD-10-CM

## 2017-07-13 LAB
ALBUMIN SERPL-MCNC: 3.7 G/DL (ref 3.5–4.8)
ALP LIVER SERPL-CCNC: 107 U/L (ref 45–117)
ALT SERPL-CCNC: 63 U/L (ref 17–63)
ANTIBODY SCREEN: NEGATIVE
APTT PPP: 39.6 SECONDS (ref 25–34)
AST SERPL-CCNC: 28 U/L (ref 15–41)
BASOPHILS # BLD AUTO: 0.04 X10(3) UL (ref 0–0.1)
BASOPHILS NFR BLD AUTO: 0.7 %
BILIRUB SERPL-MCNC: 0.5 MG/DL (ref 0.1–2)
BUN BLD-MCNC: 12 MG/DL (ref 8–20)
CALCIUM BLD-MCNC: 9.7 MG/DL (ref 8.3–10.3)
CHLORIDE: 109 MMOL/L (ref 101–111)
CO2: 27 MMOL/L (ref 22–32)
CREAT BLD-MCNC: 1.14 MG/DL (ref 0.7–1.3)
EOSINOPHIL # BLD AUTO: 0.34 X10(3) UL (ref 0–0.3)
EOSINOPHIL NFR BLD AUTO: 5.8 %
ERYTHROCYTE [DISTWIDTH] IN BLOOD BY AUTOMATED COUNT: 14.4 % (ref 11.5–16)
GLUCOSE BLD-MCNC: 91 MG/DL (ref 70–99)
HCT VFR BLD AUTO: 42.5 % (ref 37–53)
HGB BLD-MCNC: 14.6 G/DL (ref 13–17)
IMMATURE GRANULOCYTE COUNT: 0.01 X10(3) UL (ref 0–1)
IMMATURE GRANULOCYTE RATIO %: 0.2 %
INR BLD: 2.67 (ref 0.89–1.11)
LYMPHOCYTES # BLD AUTO: 2.12 X10(3) UL (ref 0.9–4)
LYMPHOCYTES NFR BLD AUTO: 35.9 %
M PROTEIN MFR SERPL ELPH: 7.8 G/DL (ref 6.1–8.3)
MCH RBC QN AUTO: 29.7 PG (ref 27–33.2)
MCHC RBC AUTO-ENTMCNC: 34.4 G/DL (ref 31–37)
MCV RBC AUTO: 86.4 FL (ref 80–99)
MONOCYTES # BLD AUTO: 0.48 X10(3) UL (ref 0.1–0.6)
MONOCYTES NFR BLD AUTO: 8.1 %
NEUTROPHIL ABS PRELIM: 2.92 X10 (3) UL (ref 1.3–6.7)
NEUTROPHILS # BLD AUTO: 2.92 X10(3) UL (ref 1.3–6.7)
NEUTROPHILS NFR BLD AUTO: 49.3 %
PLATELET # BLD AUTO: 132 10(3)UL (ref 150–450)
POTASSIUM SERPL-SCNC: 4.2 MMOL/L (ref 3.6–5.1)
PSA SERPL DL<=0.01 NG/ML-MCNC: 29 SECONDS (ref 12–14.3)
RBC # BLD AUTO: 4.92 X10(6)UL (ref 4.3–5.7)
RED CELL DISTRIBUTION WIDTH-SD: 45.5 FL (ref 35.1–46.3)
RH BLOOD TYPE: POSITIVE
SODIUM SERPL-SCNC: 143 MMOL/L (ref 136–144)
WBC # BLD AUTO: 5.9 X10(3) UL (ref 4–13)

## 2017-07-13 PROCEDURE — 99220 INITIAL OBSERVATION CARE,LEVL III: CPT | Performed by: SURGERY

## 2017-07-13 PROCEDURE — 0W3P8ZZ CONTROL BLEEDING IN GASTROINTESTINAL TRACT, VIA NATURAL OR ARTIFICIAL OPENING ENDOSCOPIC: ICD-10-PCS | Performed by: SURGERY

## 2017-07-13 PROCEDURE — 0DQP4ZZ REPAIR RECTUM, PERCUTANEOUS ENDOSCOPIC APPROACH: ICD-10-PCS | Performed by: SURGERY

## 2017-07-13 RX ORDER — DOCUSATE SODIUM 100 MG/1
100 CAPSULE, LIQUID FILLED ORAL 2 TIMES DAILY
Status: DISCONTINUED | OUTPATIENT
Start: 2017-07-13 | End: 2017-07-13

## 2017-07-13 RX ORDER — ONDANSETRON 2 MG/ML
4 INJECTION INTRAMUSCULAR; INTRAVENOUS AS NEEDED
Status: DISCONTINUED | OUTPATIENT
Start: 2017-07-13 | End: 2017-07-13 | Stop reason: HOSPADM

## 2017-07-13 RX ORDER — HYDROMORPHONE HYDROCHLORIDE 1 MG/ML
0.5 INJECTION, SOLUTION INTRAMUSCULAR; INTRAVENOUS; SUBCUTANEOUS EVERY 30 MIN PRN
Status: DISCONTINUED | OUTPATIENT
Start: 2017-07-13 | End: 2017-07-13

## 2017-07-13 RX ORDER — ZOLPIDEM TARTRATE 5 MG/1
5 TABLET ORAL NIGHTLY PRN
Status: DISCONTINUED | OUTPATIENT
Start: 2017-07-13 | End: 2017-07-13

## 2017-07-13 RX ORDER — SODIUM CHLORIDE 9 MG/ML
INJECTION, SOLUTION INTRAVENOUS CONTINUOUS
Status: DISCONTINUED | OUTPATIENT
Start: 2017-07-13 | End: 2017-07-13

## 2017-07-13 RX ORDER — ERGOCALCIFEROL 1.25 MG/1
4000 CAPSULE ORAL DAILY
COMMUNITY

## 2017-07-13 RX ORDER — ONDANSETRON 2 MG/ML
4 INJECTION INTRAMUSCULAR; INTRAVENOUS EVERY 6 HOURS PRN
Status: DISCONTINUED | OUTPATIENT
Start: 2017-07-13 | End: 2017-07-13

## 2017-07-13 RX ORDER — HYDROMORPHONE HYDROCHLORIDE 1 MG/ML
1 INJECTION, SOLUTION INTRAMUSCULAR; INTRAVENOUS; SUBCUTANEOUS EVERY 30 MIN PRN
Status: DISCONTINUED | OUTPATIENT
Start: 2017-07-13 | End: 2017-07-13

## 2017-07-13 RX ORDER — FAMOTIDINE 10 MG/ML
20 INJECTION, SOLUTION INTRAVENOUS 2 TIMES DAILY
Status: DISCONTINUED | OUTPATIENT
Start: 2017-07-13 | End: 2017-07-13

## 2017-07-13 RX ORDER — HYDROCODONE BITARTRATE AND ACETAMINOPHEN 5; 325 MG/1; MG/1
1-2 TABLET ORAL
Qty: 20 TABLET | Refills: 0 | Status: SHIPPED | OUTPATIENT
Start: 2017-07-13 | End: 2017-08-22

## 2017-07-13 RX ORDER — CEFOXITIN 2 G/1
INJECTION, POWDER, FOR SOLUTION INTRAVENOUS
Status: DISCONTINUED | OUTPATIENT
Start: 2017-07-13 | End: 2017-07-13

## 2017-07-13 RX ORDER — ONDANSETRON 2 MG/ML
4 INJECTION INTRAMUSCULAR; INTRAVENOUS EVERY 4 HOURS PRN
Status: DISCONTINUED | OUTPATIENT
Start: 2017-07-13 | End: 2017-07-13

## 2017-07-13 RX ORDER — ACETAMINOPHEN 325 MG/1
650 TABLET ORAL 4 TIMES DAILY
Status: DISCONTINUED | OUTPATIENT
Start: 2017-07-13 | End: 2017-07-13

## 2017-07-13 RX ORDER — HYDROCODONE BITARTRATE AND ACETAMINOPHEN 5; 325 MG/1; MG/1
1 TABLET ORAL AS NEEDED
Status: DISCONTINUED | OUTPATIENT
Start: 2017-07-13 | End: 2017-07-13 | Stop reason: HOSPADM

## 2017-07-13 RX ORDER — METOCLOPRAMIDE HYDROCHLORIDE 5 MG/ML
10 INJECTION INTRAMUSCULAR; INTRAVENOUS AS NEEDED
Status: DISCONTINUED | OUTPATIENT
Start: 2017-07-13 | End: 2017-07-13 | Stop reason: HOSPADM

## 2017-07-13 RX ORDER — FAMOTIDINE 20 MG/1
20 TABLET ORAL 2 TIMES DAILY
Status: DISCONTINUED | OUTPATIENT
Start: 2017-07-13 | End: 2017-07-13

## 2017-07-13 RX ORDER — MEPERIDINE HYDROCHLORIDE 25 MG/ML
12.5 INJECTION INTRAMUSCULAR; INTRAVENOUS; SUBCUTANEOUS AS NEEDED
Status: DISCONTINUED | OUTPATIENT
Start: 2017-07-13 | End: 2017-07-13 | Stop reason: HOSPADM

## 2017-07-13 RX ORDER — SODIUM CHLORIDE, SODIUM LACTATE, POTASSIUM CHLORIDE, CALCIUM CHLORIDE 600; 310; 30; 20 MG/100ML; MG/100ML; MG/100ML; MG/100ML
INJECTION, SOLUTION INTRAVENOUS CONTINUOUS
Status: DISCONTINUED | OUTPATIENT
Start: 2017-07-13 | End: 2017-07-13

## 2017-07-13 RX ORDER — DEXAMETHASONE SODIUM PHOSPHATE 4 MG/ML
4 VIAL (ML) INJECTION AS NEEDED
Status: DISCONTINUED | OUTPATIENT
Start: 2017-07-13 | End: 2017-07-13 | Stop reason: HOSPADM

## 2017-07-13 RX ORDER — ACETAMINOPHEN 500 MG
1000 TABLET ORAL ONCE AS NEEDED
Status: DISCONTINUED | OUTPATIENT
Start: 2017-07-13 | End: 2017-07-13 | Stop reason: HOSPADM

## 2017-07-13 RX ORDER — HYDROCODONE BITARTRATE AND ACETAMINOPHEN 5; 325 MG/1; MG/1
2 TABLET ORAL AS NEEDED
Status: DISCONTINUED | OUTPATIENT
Start: 2017-07-13 | End: 2017-07-13 | Stop reason: HOSPADM

## 2017-07-13 RX ORDER — BUPIVACAINE HYDROCHLORIDE 5 MG/ML
INJECTION, SOLUTION EPIDURAL; INTRACAUDAL AS NEEDED
Status: DISCONTINUED | OUTPATIENT
Start: 2017-07-13 | End: 2017-07-13 | Stop reason: HOSPADM

## 2017-07-13 RX ORDER — MIDAZOLAM HYDROCHLORIDE 1 MG/ML
1 INJECTION INTRAMUSCULAR; INTRAVENOUS EVERY 5 MIN PRN
Status: DISCONTINUED | OUTPATIENT
Start: 2017-07-13 | End: 2017-07-13 | Stop reason: HOSPADM

## 2017-07-13 RX ORDER — HYDROMORPHONE HYDROCHLORIDE 1 MG/ML
0.4 INJECTION, SOLUTION INTRAMUSCULAR; INTRAVENOUS; SUBCUTANEOUS EVERY 5 MIN PRN
Status: DISCONTINUED | OUTPATIENT
Start: 2017-07-13 | End: 2017-07-13 | Stop reason: HOSPADM

## 2017-07-13 RX ORDER — ALFUZOSIN HYDROCHLORIDE 10 MG/1
10 TABLET, EXTENDED RELEASE ORAL
Status: DISCONTINUED | OUTPATIENT
Start: 2017-07-13 | End: 2017-07-13

## 2017-07-13 RX ORDER — NALOXONE HYDROCHLORIDE 0.4 MG/ML
80 INJECTION, SOLUTION INTRAMUSCULAR; INTRAVENOUS; SUBCUTANEOUS AS NEEDED
Status: DISCONTINUED | OUTPATIENT
Start: 2017-07-13 | End: 2017-07-13 | Stop reason: HOSPADM

## 2017-07-13 NOTE — OPERATIVE REPORT
[facility]    PATIENT'S NAME: Michael Lai   ATTENDING PHYSICIAN: Edith Green D.O.   OPERATING PHYSICIAN: Edith Green D.O.   PATIENT ACCOUNT#:   [de-identified]    LOCATION:  PACU [address] [place], [place] [address]U [facility]  MEDICAL RECORD #:   [id number]       DATE OF BIRTH: of 0.5% Marcaine plain was injected into the perianal tissues for pain relief. The patient was extubated in the operative suite and transported to Recovery in stable condition.     Dictated By Elisa Sanders D.O.  d: 07/13/2017 05:30:05  t: 07/13/2017 05:4

## 2017-07-13 NOTE — BRIEF OP NOTE
Pre-Operative Diagnosis: Rectal bleeding [K62.5]     Post-Operative Diagnosis: RECTAL BLEEDING     Procedure Performed:   Procedure(s):  RECTAL EXAM UNDER ANESTHESIA,oversew of bleeding vessel in rectal ulceration    Surgeon(s) and Role:     * Ant Granda

## 2017-07-13 NOTE — PROGRESS NOTES
[facility]  Progress Note    Pat Suazo Patient Status:  Observation    1955 MRN [id number]   [facility] 3NW-A Attending Alberto Euceda DO   Hosp Day # 0 PCP Beulah Diamond DO     Subjective:  No new complaints, tolerating d without hemorrhage     Kidney stones     MTHFR mutation (HCC)     LEANDRA on CPAP     Umbilical hernia with obstruction, without gangrene     Benign non-nodular prostatic hyperplasia     Groin mass     Umbilical hernia without obstruction and without gangrene

## 2017-07-13 NOTE — ED NOTES
Call to floor and updated Brian Douglas that pt will go to OR first. Call to OR and updated ERWIN Sutherland that 2 units FFP will take 20 min to thaw per Blood Bank and OR should contact BB for tube station #.  Pt and spouse updated on poc and pt completed Blood tra

## 2017-07-13 NOTE — PROGRESS NOTES
Discharge instructions reviewed with the patient and patient's family member at the bedside during discussion. All questions answered and encouraged until no further questions. Pt reports he feels ready to go home.  (2) iv sites removed (right AC and right

## 2017-07-13 NOTE — ED INITIAL ASSESSMENT (HPI)
Pt ambulatory to er with family cc intermittent bright red rectal bleeding since 1600 Wednesday. Pt took a hot sitz bath = no relief then called pmd.    Denies pain. Rectal surg at ED/Dr Vijay Tamayo 3 wks ago.

## 2017-07-13 NOTE — H&P
Jonathan Mcnally is a 65year old male  Patient presents with:  Bleeding (hematologic)      REFERRED BY    Patient presents with rectal bleeding onset the day prior to evaluation.   Patient was out golfing when he noted a small amount of bleeding perianally B COMPLEX OR) Take 1 tablet by mouth daily. Disp:  Rfl:    POTASSIUM OR Take 1 tablet by mouth daily. Disp:  Rfl:    Omega-3 Fatty Acids (FISH OIL) 1200 MG Oral Cap Take 1 capsule by mouth daily.  Disp:  Rfl:    aspirin 81 MG Oral Tab Take 81 mg by mouth da clear to auscultation, no rales , rhonchi or wheezing  CARDIOVASCULAR: RRR, murmur negative  ABDOMEN: normal active BS, soft, nondistended  no HSM, no masses or hernias  LYMPHATIC: no axillary , supraclavicular or inguinal lymphadenopathy  EXTREMITIES: no

## 2017-07-13 NOTE — PLAN OF CARE
Pt resting in bed this morning, family at the bedside. Pt is alert and orient X3. Abdomen is soft, rounded, non-distended. Bowel sounds noted. Per pt he is passing flatus and had a soft bowel movement this morning.  Rectal dressing (4x4) noted, clean/dry/in

## 2017-07-13 NOTE — ANESTHESIA PREPROCEDURE EVALUATION
PRE-OP EVALUATION    Patient Name: Margarita Notice    Pre-op Diagnosis: Rectal bleeding [K62.5]    Procedure(s):  RECTAL EXAM UNDER ANESTHESIA, POSSIBLE CAUTERIZATION OF BLEEDING    Surgeon(s) and Role:     Lu Swain, DO - Primary    Pre-op vitals 132.0 (L) 07/13/2017       Lab Results  Component Value Date    07/13/2017   K 4.2 07/13/2017    07/13/2017   CO2 27.0 07/13/2017   BUN 12 07/13/2017   CREATSERUM 1.14 07/13/2017   GLU 91 07/13/2017   CA 9.7 07/13/2017       Lab Results  Compon

## 2017-07-13 NOTE — ED PROVIDER NOTES
Patient Seen in: [facility] Emergency Department    History   Patient presents with:  Bleeding (hematologic)    Stated Complaint: rectal bleeding x8.5 hours, pt denies any pain at this time. HPI    58-year-old male presents with rectal bleeding. Tab,  Take 81 mg by mouth daily. methylPREDNISolone (MEDROL) 4 MG Oral Tablet Therapy Pack,  As directed   cephALEXin 500 MG Oral Cap,  Take 1 capsule (500 mg total) by mouth 4 (four) times daily.        Family History   Problem Relation Age of Onset   • the following:        Result Value    PT 29.0 (*)     INR 2.67 (*)     All other components within normal limits   PTT, ACTIVATED - Abnormal; Notable for the following:     PTT 39.6 (*)     All other components within normal limits    Narrative:      The aP including INR. Heavy bleeding on examination. Will discuss with surgery. INR therapeutic. Discussed with surgery who plans to take patient to OR later this morning. Vitamin K ordered to reverse Coumadin. FFP ordered to the OR.   Discussed with alan

## 2017-07-14 ENCOUNTER — PATIENT OUTREACH (OUTPATIENT)
Dept: CASE MANAGEMENT | Age: 62
End: 2017-07-14

## 2017-07-14 ENCOUNTER — TELEPHONE (OUTPATIENT)
Dept: FAMILY MEDICINE CLINIC | Facility: CLINIC | Age: 62
End: 2017-07-14

## 2017-07-14 DIAGNOSIS — K62.5 RECTAL BLEEDING: ICD-10-CM

## 2017-07-14 LAB — BLOOD TYPE BARCODE: 7300

## 2017-07-14 NOTE — TELEPHONE ENCOUNTER
Please see attached message. OR per Dr. Becca العراقي for rectal bleeding from healing rectal ulceration secondary to prior transanal polyp excision.

## 2017-07-14 NOTE — PROGRESS NOTES
Initial Post Discharge Follow Up   Discharge Date: 7/13/17  Contact Date: 7/14/2017    Consent Verification:  Assessment Completed With: Patient  HIPAA Verified?   Yes    Discharge Dx:   Rectal bleeding    General:   • How have you been since your discha tablet by mouth daily. Disp:  Rfl:    POTASSIUM OR Take 1 tablet by mouth daily. Disp:  Rfl:    WARFARIN SODIUM 5 MG Oral Tab TAKE 1-2 TABLETS BY MOUTH EVERY DAY AS DIRECTED BY COUMADIN CLINIC.  Disp: 180 tablet Rfl: 0   Omega-3 Fatty Acids (FISH OIL) 1200 Radhika Burris at this time  - [facility] sent message to MD's office. Have you made all of your follow up appointments? no, he will call Dr. Jo Ann Suazo    Is there any reason as to why you cannot make your appointments?    No     [facility] Reviewed upcoming Specialist Appt w

## 2017-07-14 NOTE — TELEPHONE ENCOUNTER
Patient discharged from [facility] on 7/13/17. NCM attempted to schedule TCM HFU appointment, patient does not feel he needs to follow up with Dr. Lilian Leger at this time.     Triage: Please inform Dr. Lilian Leger and if she wishes to see the patient for a TC

## 2017-07-24 ENCOUNTER — ANTI-COAG VISIT (OUTPATIENT)
Dept: HEMATOLOGY/ONCOLOGY | Facility: HOSPITAL | Age: 62
End: 2017-07-24

## 2017-07-24 DIAGNOSIS — Z79.01 LONG TERM (CURRENT) USE OF ANTICOAGULANTS: ICD-10-CM

## 2017-07-24 LAB
INR: 2.2 (ref 0.8–1.2)
INR: 2.2 (ref 0.8–1.2)

## 2017-08-06 LAB — INR: 2.8 (ref 0.8–1.2)

## 2017-08-07 ENCOUNTER — ANTI-COAG VISIT (OUTPATIENT)
Dept: HEMATOLOGY/ONCOLOGY | Facility: HOSPITAL | Age: 62
End: 2017-08-07

## 2017-08-07 DIAGNOSIS — Z79.01 LONG TERM (CURRENT) USE OF ANTICOAGULANTS: ICD-10-CM

## 2017-08-14 ENCOUNTER — ANTI-COAG VISIT (OUTPATIENT)
Dept: HEMATOLOGY/ONCOLOGY | Facility: HOSPITAL | Age: 62
End: 2017-08-14

## 2017-08-14 DIAGNOSIS — Z79.01 LONG TERM (CURRENT) USE OF ANTICOAGULANTS: ICD-10-CM

## 2017-08-14 LAB — INR: 2.8 (ref 0.8–1.2)

## 2017-08-15 RX ORDER — TAMSULOSIN HYDROCHLORIDE 0.4 MG/1
CAPSULE ORAL
Qty: 60 CAPSULE | Refills: 0 | Status: SHIPPED | OUTPATIENT
Start: 2017-08-15 | End: 2017-10-16

## 2017-08-21 ENCOUNTER — ANTI-COAG VISIT (OUTPATIENT)
Dept: HEMATOLOGY/ONCOLOGY | Facility: HOSPITAL | Age: 62
End: 2017-08-21

## 2017-08-21 LAB — INR: 2.4 (ref 0.8–1.2)

## 2017-08-22 ENCOUNTER — OFFICE VISIT (OUTPATIENT)
Dept: SURGERY | Facility: CLINIC | Age: 62
End: 2017-08-22

## 2017-08-22 VITALS
BODY MASS INDEX: 35.79 KG/M2 | RESPIRATION RATE: 18 BRPM | WEIGHT: 250 LBS | HEART RATE: 18 BPM | TEMPERATURE: 99 F | HEIGHT: 70 IN

## 2017-08-22 DIAGNOSIS — D12.9 BENIGN NEOPLASM OF ANAL CANAL: Primary | ICD-10-CM

## 2017-08-22 DIAGNOSIS — K62.5 RECTAL BLEEDING: ICD-10-CM

## 2017-08-22 PROCEDURE — 99024 POSTOP FOLLOW-UP VISIT: CPT | Performed by: PHYSICIAN ASSISTANT

## 2017-08-22 NOTE — PROGRESS NOTES
Post Operative Visit Note       Active Problems  1. Benign neoplasm of anal canal    2.  Rectal bleeding         Chief Complaint   Patient presents with:  Post-Op: 1st PO-  Anal examination under anesthesia, transanal over-sew of rectal bleeding done 7/13/1 • Heart Disorder Mother      Social History    Marital status:              Spouse name:                       Years of education:                 Number of children:               Social History Main Topics    Smoking status: Light Tobacco Smoke stool, constipation, diarrhea, nausea and vomiting. Genitourinary: Negative for difficulty urinating, dysuria, frequency and urgency. Musculoskeletal: Negative for arthralgias and myalgias. Skin: Negative for color change and rash.    Neurological: Ne  2:39 PM       Plan   I reviewed the patient's pathology, appropriate aftercare, and indications for further follow-up in detail with the patient. Final pathology demonstrated a benign anal polyp along with hemorrhoidal tissue.   No malignancy or other abn

## 2017-08-28 ENCOUNTER — ANTI-COAG VISIT (OUTPATIENT)
Dept: HEMATOLOGY/ONCOLOGY | Facility: HOSPITAL | Age: 62
End: 2017-08-28

## 2017-08-28 LAB — INR: 2.4 (ref 0.8–1.2)

## 2017-09-04 LAB — INR: 2.5 (ref 0.8–1.2)

## 2017-09-05 ENCOUNTER — ANTI-COAG VISIT (OUTPATIENT)
Dept: HEMATOLOGY/ONCOLOGY | Facility: HOSPITAL | Age: 62
End: 2017-09-05

## 2017-09-08 ENCOUNTER — NURSE ONLY (OUTPATIENT)
Dept: FAMILY MEDICINE CLINIC | Facility: CLINIC | Age: 62
End: 2017-09-08

## 2017-09-08 DIAGNOSIS — Z23 NEED FOR HEPATITIS B VACCINATION: ICD-10-CM

## 2017-09-08 PROCEDURE — 90471 IMMUNIZATION ADMIN: CPT | Performed by: FAMILY MEDICINE

## 2017-09-08 PROCEDURE — 90746 HEPB VACCINE 3 DOSE ADULT IM: CPT | Performed by: FAMILY MEDICINE

## 2017-09-18 ENCOUNTER — ANTI-COAG VISIT (OUTPATIENT)
Dept: HEMATOLOGY/ONCOLOGY | Facility: HOSPITAL | Age: 62
End: 2017-09-18

## 2017-09-18 LAB — INR: 2.6 (ref 0.8–1.2)

## 2017-09-26 ENCOUNTER — ANTI-COAG VISIT (OUTPATIENT)
Dept: HEMATOLOGY/ONCOLOGY | Facility: HOSPITAL | Age: 62
End: 2017-09-26

## 2017-09-26 LAB — INR: 2.6 (ref 0.8–1.2)

## 2017-10-03 ENCOUNTER — ANTI-COAG VISIT (OUTPATIENT)
Dept: HEMATOLOGY/ONCOLOGY | Facility: HOSPITAL | Age: 62
End: 2017-10-03

## 2017-10-09 ENCOUNTER — TELEPHONE (OUTPATIENT)
Dept: FAMILY MEDICINE CLINIC | Facility: CLINIC | Age: 62
End: 2017-10-09

## 2017-10-09 ENCOUNTER — ANTI-COAG VISIT (OUTPATIENT)
Dept: HEMATOLOGY/ONCOLOGY | Facility: HOSPITAL | Age: 62
End: 2017-10-09

## 2017-10-09 DIAGNOSIS — Z01.818 PREOP TESTING: Primary | ICD-10-CM

## 2017-10-09 RX ORDER — SODIUM CHLORIDE, SODIUM LACTATE, POTASSIUM CHLORIDE, CALCIUM CHLORIDE 600; 310; 30; 20 MG/100ML; MG/100ML; MG/100ML; MG/100ML
INJECTION, SOLUTION INTRAVENOUS CONTINUOUS
Status: CANCELLED | OUTPATIENT
Start: 2017-10-09

## 2017-10-16 ENCOUNTER — ANTI-COAG VISIT (OUTPATIENT)
Dept: HEMATOLOGY/ONCOLOGY | Facility: HOSPITAL | Age: 62
End: 2017-10-16

## 2017-10-16 ENCOUNTER — TELEPHONE (OUTPATIENT)
Dept: HEMATOLOGY/ONCOLOGY | Facility: HOSPITAL | Age: 62
End: 2017-10-16

## 2017-10-16 ENCOUNTER — APPOINTMENT (OUTPATIENT)
Dept: LAB | Age: 62
End: 2017-10-16
Attending: FAMILY MEDICINE
Payer: COMMERCIAL

## 2017-10-16 DIAGNOSIS — Z01.818 PREOP TESTING: ICD-10-CM

## 2017-10-16 PROCEDURE — 36415 COLL VENOUS BLD VENIPUNCTURE: CPT | Performed by: FAMILY MEDICINE

## 2017-10-16 PROCEDURE — 80053 COMPREHEN METABOLIC PANEL: CPT | Performed by: FAMILY MEDICINE

## 2017-10-16 RX ORDER — TAMSULOSIN HYDROCHLORIDE 0.4 MG/1
CAPSULE ORAL
Qty: 60 CAPSULE | Refills: 0 | Status: SHIPPED | OUTPATIENT
Start: 2017-10-16 | End: 2017-12-05

## 2017-10-16 NOTE — TELEPHONE ENCOUNTER
Pt is scheduled on Wednesday 05/96 for umbilical hernia sx with Dr. Fan Peck. Would like instructions to hold coumadin and when to resume. INR 2.7 today.  Pt also wanted to make MD aware that he did have bleeding after resuming coumadin after hemorrhoidectomy

## 2017-10-16 NOTE — TELEPHONE ENCOUNTER
Sherren Beth, MD Vela Friday, RN   Caller: Unspecified (Today,  3:16 PM)             D/C 4 days before surgery. Dr. Kinsey Cummings decides when it is safe to resume. Pt made aware and verbalized understanding.

## 2017-10-24 ENCOUNTER — ANTI-COAG VISIT (OUTPATIENT)
Dept: HEMATOLOGY/ONCOLOGY | Facility: HOSPITAL | Age: 62
End: 2017-10-24

## 2017-10-24 ENCOUNTER — ANESTHESIA EVENT (OUTPATIENT)
Dept: SURGERY | Facility: HOSPITAL | Age: 62
End: 2017-10-24
Payer: COMMERCIAL

## 2017-10-24 NOTE — ANESTHESIA PREPROCEDURE EVALUATION
PRE-OP EVALUATION    Patient Name: Jonathan Mcnally    Pre-op Diagnosis: Umbilical hernia without obstruction and without gangrene [K42.9]  Mass of left inguinal region [R19.09]    Procedure(s):   UMBILICAL HERNIORRHAPHY, POSSIBLE MESH AND EXCISION OF LEFT ECHOCARDIOGRAM     EXAM DATA  L.V. End Oakes:                              46 mm  L.V. End Sys:                               25 mm  L.V. Posterior Wall:                        11 mm  L.V. Septal Wall:                           12 mm  L. A.  Dimension, End Sy valves seemed to be normal in structure.     DOPPLER STUDY:  Neither cardiac valve was insufficient or stenotic by  Doppler study.  Mitral inflow was consistent with slowed relaxation and  tissue Doppler study was consistent with preserved left ventricular CREATSERUM 1.10 10/16/2017    (H) 10/16/2017   CA 9.6 10/16/2017       Lab Results  Component Value Date   INR 2.0 (A) 10/22/2017         Airway      Mallampati: II  Mouth opening: >3 FB  TM distance: 4 - 6 cm  Neck ROM: full Cardiovascular    Car

## 2017-10-25 ENCOUNTER — ANESTHESIA (OUTPATIENT)
Dept: SURGERY | Facility: HOSPITAL | Age: 62
End: 2017-10-25
Payer: COMMERCIAL

## 2017-10-25 ENCOUNTER — HOSPITAL ENCOUNTER (OUTPATIENT)
Facility: HOSPITAL | Age: 62
Setting detail: HOSPITAL OUTPATIENT SURGERY
Discharge: HOME | End: 2017-10-25
Attending: COLON & RECTAL SURGERY | Admitting: COLON & RECTAL SURGERY
Payer: COMMERCIAL

## 2017-10-25 ENCOUNTER — SURGERY (OUTPATIENT)
Age: 62
End: 2017-10-25

## 2017-10-25 VITALS
OXYGEN SATURATION: 96 % | WEIGHT: 265 LBS | RESPIRATION RATE: 17 BRPM | HEIGHT: 70 IN | BODY MASS INDEX: 37.94 KG/M2 | SYSTOLIC BLOOD PRESSURE: 121 MMHG | HEART RATE: 74 BPM | TEMPERATURE: 97 F | DIASTOLIC BLOOD PRESSURE: 84 MMHG

## 2017-10-25 DIAGNOSIS — R19.09 MASS OF LEFT INGUINAL REGION: ICD-10-CM

## 2017-10-25 DIAGNOSIS — K42.9 UMBILICAL HERNIA WITHOUT OBSTRUCTION AND WITHOUT GANGRENE: ICD-10-CM

## 2017-10-25 PROCEDURE — 85610 PROTHROMBIN TIME: CPT

## 2017-10-25 PROCEDURE — 88304 TISSUE EXAM BY PATHOLOGIST: CPT | Performed by: COLON & RECTAL SURGERY

## 2017-10-25 PROCEDURE — 0WQF0ZZ REPAIR ABDOMINAL WALL, OPEN APPROACH: ICD-10-PCS | Performed by: COLON & RECTAL SURGERY

## 2017-10-25 PROCEDURE — 0JBC0ZZ EXCISION OF PELVIC REGION SUBCUTANEOUS TISSUE AND FASCIA, OPEN APPROACH: ICD-10-PCS | Performed by: COLON & RECTAL SURGERY

## 2017-10-25 RX ORDER — HEPARIN SODIUM 5000 [USP'U]/ML
5000 INJECTION, SOLUTION INTRAVENOUS; SUBCUTANEOUS ONCE
Status: COMPLETED | OUTPATIENT
Start: 2017-10-25 | End: 2017-10-25

## 2017-10-25 RX ORDER — BUPIVACAINE HYDROCHLORIDE AND EPINEPHRINE 5; 5 MG/ML; UG/ML
INJECTION, SOLUTION EPIDURAL; INTRACAUDAL; PERINEURAL AS NEEDED
Status: DISCONTINUED | OUTPATIENT
Start: 2017-10-25 | End: 2017-10-25 | Stop reason: HOSPADM

## 2017-10-25 RX ORDER — HYDROCODONE BITARTRATE AND ACETAMINOPHEN 5; 325 MG/1; MG/1
1 TABLET ORAL EVERY 4 HOURS PRN
Qty: 20 TABLET | Refills: 0 | Status: SHIPPED | OUTPATIENT
Start: 2017-10-25 | End: 2017-10-31

## 2017-10-25 RX ORDER — MEPERIDINE HYDROCHLORIDE 25 MG/ML
12.5 INJECTION INTRAMUSCULAR; INTRAVENOUS; SUBCUTANEOUS AS NEEDED
Status: DISCONTINUED | OUTPATIENT
Start: 2017-10-25 | End: 2017-10-25

## 2017-10-25 RX ORDER — NALOXONE HYDROCHLORIDE 0.4 MG/ML
80 INJECTION, SOLUTION INTRAMUSCULAR; INTRAVENOUS; SUBCUTANEOUS AS NEEDED
Status: DISCONTINUED | OUTPATIENT
Start: 2017-10-25 | End: 2017-10-25

## 2017-10-25 RX ORDER — ONDANSETRON 2 MG/ML
4 INJECTION INTRAMUSCULAR; INTRAVENOUS AS NEEDED
Status: DISCONTINUED | OUTPATIENT
Start: 2017-10-25 | End: 2017-10-25

## 2017-10-25 RX ORDER — SODIUM CHLORIDE, SODIUM LACTATE, POTASSIUM CHLORIDE, CALCIUM CHLORIDE 600; 310; 30; 20 MG/100ML; MG/100ML; MG/100ML; MG/100ML
INJECTION, SOLUTION INTRAVENOUS CONTINUOUS
Status: DISCONTINUED | OUTPATIENT
Start: 2017-10-25 | End: 2017-10-25

## 2017-10-25 RX ORDER — DIPHENHYDRAMINE HYDROCHLORIDE 50 MG/ML
12.5 INJECTION INTRAMUSCULAR; INTRAVENOUS AS NEEDED
Status: DISCONTINUED | OUTPATIENT
Start: 2017-10-25 | End: 2017-10-25

## 2017-10-25 RX ORDER — HYDROCODONE BITARTRATE AND ACETAMINOPHEN 5; 325 MG/1; MG/1
TABLET ORAL
Status: COMPLETED
Start: 2017-10-25 | End: 2017-10-25

## 2017-10-25 RX ORDER — HYDROCODONE BITARTRATE AND ACETAMINOPHEN 5; 325 MG/1; MG/1
1 TABLET ORAL EVERY 4 HOURS PRN
Status: DISCONTINUED | OUTPATIENT
Start: 2017-10-25 | End: 2017-10-25

## 2017-10-25 RX ORDER — HEPARIN SODIUM 5000 [USP'U]/ML
INJECTION, SOLUTION INTRAVENOUS; SUBCUTANEOUS
Status: COMPLETED
Start: 2017-10-25 | End: 2017-10-25

## 2017-10-25 RX ORDER — HYDROMORPHONE HYDROCHLORIDE 1 MG/ML
INJECTION, SOLUTION INTRAMUSCULAR; INTRAVENOUS; SUBCUTANEOUS
Status: COMPLETED
Start: 2017-10-25 | End: 2017-10-25

## 2017-10-25 RX ORDER — HYDROMORPHONE HYDROCHLORIDE 1 MG/ML
0.4 INJECTION, SOLUTION INTRAMUSCULAR; INTRAVENOUS; SUBCUTANEOUS EVERY 5 MIN PRN
Status: DISCONTINUED | OUTPATIENT
Start: 2017-10-25 | End: 2017-10-25

## 2017-10-25 RX ORDER — HYDROCODONE BITARTRATE AND ACETAMINOPHEN 5; 325 MG/1; MG/1
2 TABLET ORAL EVERY 4 HOURS PRN
Status: DISCONTINUED | OUTPATIENT
Start: 2017-10-25 | End: 2017-10-25

## 2017-10-25 RX ORDER — CEFAZOLIN SODIUM 1 G/3ML
INJECTION, POWDER, FOR SOLUTION INTRAMUSCULAR; INTRAVENOUS
Status: DISCONTINUED | OUTPATIENT
Start: 2017-10-25 | End: 2017-10-25 | Stop reason: HOSPADM

## 2017-10-25 NOTE — H&P
History & Physical Examination    Patient Name: Gabriel Mckenna  MRN: [id number]  CSN: [id number]  YOB: 1955    Diagnosis: left groin lipoma, incarcerated umbilical hernia    Present Illness: large lump in left groin.  Large umbilical hernia g Allergies    Past Medical History:   Diagnosis Date   • Blood disorder     thromboctopenia, polycthemia vera   • Calculus of kidney    • Deep vein thrombosis ([facility] 2013    left   • Methylenetetrahydrofolate reductase (MTHFR) gene mutation ([facility]    • PULMON

## 2017-10-25 NOTE — OPERATIVE REPORT
[facility]  Op Note    Emerita Rel[facility] Location: OR   [facility] [id number] MRN [id number]   Admission Date 10/25/2017 Operation Date 10/25/2017   Attending Physician Malissa Deleon MD Operating Physician Vero Martel MD     Pre-Operative Diagnosis: Nivia Snell fashion. The operative site was injected with Marcaine 0.5% with epinephrine. The patient had Steri-Strips placed over benzoin adhesive. We then addressed the soft tissue mass in the left groin region.   It occupies an area that spans the left gr

## 2017-10-25 NOTE — ANESTHESIA POSTPROCEDURE EVALUATION
[address] Patient Status:  Hospital Outpatient Surgery   Age/Gender 65year old male MRN [id number]   [facility] SURGERY Attending Rajendra Colindres MD   [facility] Day # 0 PCP Sergey Lizarraga DO       Anesthesia Post-op Note

## 2017-10-31 ENCOUNTER — OFFICE VISIT (OUTPATIENT)
Dept: SURGERY | Facility: CLINIC | Age: 62
End: 2017-10-31

## 2017-10-31 ENCOUNTER — ANTI-COAG VISIT (OUTPATIENT)
Dept: HEMATOLOGY/ONCOLOGY | Facility: HOSPITAL | Age: 62
End: 2017-10-31

## 2017-10-31 VITALS — HEIGHT: 70 IN | BODY MASS INDEX: 37.22 KG/M2 | TEMPERATURE: 98 F | RESPIRATION RATE: 18 BRPM | WEIGHT: 260 LBS

## 2017-10-31 DIAGNOSIS — K42.0 UMBILICAL HERNIA WITH OBSTRUCTION, WITHOUT GANGRENE: Primary | ICD-10-CM

## 2017-10-31 DIAGNOSIS — R19.09 MASS OF LEFT INGUINAL REGION: ICD-10-CM

## 2017-10-31 PROCEDURE — 99024 POSTOP FOLLOW-UP VISIT: CPT | Performed by: PHYSICIAN ASSISTANT

## 2017-10-31 NOTE — PROGRESS NOTES
Post Operative Visit Note       Active Problems  1. Umbilical hernia with obstruction, without gangrene    2.  Mass of left inguinal region         Chief Complaint   Patient presents with:  Post-Op: 1st PO- Umbilical Herniorrhaphy, incarcerated; excision of Mother      Social History    Marital status:              Spouse name:                       Years of education:                 Number of children:               Social History Main Topics    Smoking status: Light Tobacco Smoker and myalgias. Skin: Negative for color change and rash. Neurological: Negative for tremors, syncope and weakness. Hematological: Negative for adenopathy. Does not bruise/bleed easily.    Psychiatric/Behavioral: Negative for behavioral problems and sle will follow-up with the patient regarding these results later this week once the report becomes available to me. The patient's drain was removed. The patient voiced immediate relief of his discomfort in the area.   The patient should expect some drainag

## 2017-11-06 ENCOUNTER — ANTI-COAG VISIT (OUTPATIENT)
Dept: HEMATOLOGY/ONCOLOGY | Facility: HOSPITAL | Age: 62
End: 2017-11-06

## 2017-11-14 ENCOUNTER — ANTI-COAG VISIT (OUTPATIENT)
Dept: HEMATOLOGY/ONCOLOGY | Facility: HOSPITAL | Age: 62
End: 2017-11-14

## 2017-11-14 DIAGNOSIS — Z79.01 LONG TERM (CURRENT) USE OF ANTICOAGULANTS: ICD-10-CM

## 2017-11-20 ENCOUNTER — ANTI-COAG VISIT (OUTPATIENT)
Dept: HEMATOLOGY/ONCOLOGY | Facility: HOSPITAL | Age: 62
End: 2017-11-20

## 2017-11-20 DIAGNOSIS — Z79.01 LONG TERM (CURRENT) USE OF ANTICOAGULANTS: ICD-10-CM

## 2017-11-27 ENCOUNTER — ANTI-COAG VISIT (OUTPATIENT)
Dept: HEMATOLOGY/ONCOLOGY | Facility: HOSPITAL | Age: 62
End: 2017-11-27

## 2017-11-27 DIAGNOSIS — Z79.01 LONG TERM (CURRENT) USE OF ANTICOAGULANTS: ICD-10-CM

## 2017-12-05 ENCOUNTER — ANTI-COAG VISIT (OUTPATIENT)
Dept: HEMATOLOGY/ONCOLOGY | Facility: HOSPITAL | Age: 62
End: 2017-12-05

## 2017-12-05 ENCOUNTER — MED REC SCAN ONLY (OUTPATIENT)
Dept: HEMATOLOGY/ONCOLOGY | Facility: HOSPITAL | Age: 62
End: 2017-12-05

## 2017-12-05 DIAGNOSIS — Z79.01 LONG TERM (CURRENT) USE OF ANTICOAGULANTS: ICD-10-CM

## 2017-12-05 RX ORDER — TAMSULOSIN HYDROCHLORIDE 0.4 MG/1
CAPSULE ORAL
Qty: 60 CAPSULE | Refills: 0 | Status: SHIPPED | OUTPATIENT
Start: 2017-12-05 | End: 2018-02-01

## 2017-12-12 ENCOUNTER — ANTI-COAG VISIT (OUTPATIENT)
Dept: HEMATOLOGY/ONCOLOGY | Facility: HOSPITAL | Age: 62
End: 2017-12-12

## 2017-12-12 DIAGNOSIS — Z79.01 LONG TERM (CURRENT) USE OF ANTICOAGULANTS: ICD-10-CM

## 2017-12-18 ENCOUNTER — ANTI-COAG VISIT (OUTPATIENT)
Dept: HEMATOLOGY/ONCOLOGY | Facility: HOSPITAL | Age: 62
End: 2017-12-18

## 2017-12-18 DIAGNOSIS — Z79.01 LONG TERM (CURRENT) USE OF ANTICOAGULANTS: ICD-10-CM

## 2018-01-01 LAB — INR: 2.8 (ref 0.8–1.2)

## 2018-01-02 ENCOUNTER — ANTI-COAG VISIT (OUTPATIENT)
Dept: HEMATOLOGY/ONCOLOGY | Facility: HOSPITAL | Age: 63
End: 2018-01-02

## 2018-01-02 DIAGNOSIS — Z79.01 LONG TERM (CURRENT) USE OF ANTICOAGULANTS: ICD-10-CM

## 2018-01-08 LAB
INR: 2.6 (ref 0.8–1.2)
INR: 2.8 (ref 0.8–1.2)

## 2018-01-09 ENCOUNTER — ANTI-COAG VISIT (OUTPATIENT)
Dept: HEMATOLOGY/ONCOLOGY | Facility: HOSPITAL | Age: 63
End: 2018-01-09

## 2018-01-09 ENCOUNTER — MED REC SCAN ONLY (OUTPATIENT)
Dept: HEMATOLOGY/ONCOLOGY | Facility: HOSPITAL | Age: 63
End: 2018-01-09

## 2018-01-09 DIAGNOSIS — Z79.01 LONG TERM (CURRENT) USE OF ANTICOAGULANTS: ICD-10-CM

## 2018-01-15 ENCOUNTER — ANTI-COAG VISIT (OUTPATIENT)
Dept: HEMATOLOGY/ONCOLOGY | Facility: HOSPITAL | Age: 63
End: 2018-01-15

## 2018-01-15 DIAGNOSIS — Z79.01 LONG TERM (CURRENT) USE OF ANTICOAGULANTS: ICD-10-CM

## 2018-01-15 LAB — INR: 2.8 (ref 0.8–1.2)

## 2018-01-22 LAB — INR: 2.8 (ref 0.8–1.2)

## 2018-01-23 ENCOUNTER — ANTI-COAG VISIT (OUTPATIENT)
Dept: HEMATOLOGY/ONCOLOGY | Facility: HOSPITAL | Age: 63
End: 2018-01-23

## 2018-01-23 DIAGNOSIS — Z79.01 LONG TERM (CURRENT) USE OF ANTICOAGULANTS: ICD-10-CM

## 2018-01-28 ENCOUNTER — ANTI-COAG VISIT (OUTPATIENT)
Dept: HEMATOLOGY/ONCOLOGY | Facility: HOSPITAL | Age: 63
End: 2018-01-28

## 2018-01-28 DIAGNOSIS — Z79.01 LONG TERM (CURRENT) USE OF ANTICOAGULANTS: ICD-10-CM

## 2018-01-28 LAB — INR: 2.8 (ref 0.8–1.2)

## 2018-02-01 RX ORDER — TAMSULOSIN HYDROCHLORIDE 0.4 MG/1
CAPSULE ORAL
Qty: 60 CAPSULE | Refills: 0 | Status: SHIPPED | OUTPATIENT
Start: 2018-02-01 | End: 2018-03-27

## 2018-02-01 RX ORDER — TAMSULOSIN HYDROCHLORIDE 0.4 MG/1
CAPSULE ORAL
Qty: 60 CAPSULE | Refills: 0 | Status: SHIPPED | OUTPATIENT
Start: 2018-02-01 | End: 2018-05-17

## 2018-02-04 LAB — INR: 2.7 (ref 0.8–1.2)

## 2018-02-05 ENCOUNTER — ANTI-COAG VISIT (OUTPATIENT)
Dept: HEMATOLOGY/ONCOLOGY | Facility: HOSPITAL | Age: 63
End: 2018-02-05

## 2018-02-05 DIAGNOSIS — Z79.01 LONG TERM (CURRENT) USE OF ANTICOAGULANTS: ICD-10-CM

## 2018-02-12 LAB — INR: 2.8 (ref 0.8–1.2)

## 2018-02-14 ENCOUNTER — ANTI-COAG VISIT (OUTPATIENT)
Dept: HEMATOLOGY/ONCOLOGY | Facility: HOSPITAL | Age: 63
End: 2018-02-14

## 2018-02-14 DIAGNOSIS — Z79.01 LONG TERM (CURRENT) USE OF ANTICOAGULANTS: ICD-10-CM

## 2018-02-19 LAB — INR: 2.8 (ref 0.8–1.2)

## 2018-02-22 ENCOUNTER — MED REC SCAN ONLY (OUTPATIENT)
Dept: HEMATOLOGY/ONCOLOGY | Facility: HOSPITAL | Age: 63
End: 2018-02-22

## 2018-02-22 ENCOUNTER — ANTI-COAG VISIT (OUTPATIENT)
Dept: HEMATOLOGY/ONCOLOGY | Facility: HOSPITAL | Age: 63
End: 2018-02-22

## 2018-02-22 DIAGNOSIS — Z79.01 LONG TERM (CURRENT) USE OF ANTICOAGULANTS: ICD-10-CM

## 2018-02-26 LAB — INR: 2.2 (ref 0.8–1.2)

## 2018-02-27 ENCOUNTER — ANTI-COAG VISIT (OUTPATIENT)
Dept: HEMATOLOGY/ONCOLOGY | Facility: HOSPITAL | Age: 63
End: 2018-02-27

## 2018-02-27 ENCOUNTER — MED REC SCAN ONLY (OUTPATIENT)
Dept: HEMATOLOGY/ONCOLOGY | Facility: HOSPITAL | Age: 63
End: 2018-02-27

## 2018-02-27 DIAGNOSIS — Z79.01 LONG TERM (CURRENT) USE OF ANTICOAGULANTS: ICD-10-CM

## 2018-03-05 LAB — INR: 2.2 (ref 0.8–1.2)

## 2018-03-06 ENCOUNTER — MED REC SCAN ONLY (OUTPATIENT)
Dept: HEMATOLOGY/ONCOLOGY | Facility: HOSPITAL | Age: 63
End: 2018-03-06

## 2018-03-06 ENCOUNTER — ANTI-COAG VISIT (OUTPATIENT)
Dept: HEMATOLOGY/ONCOLOGY | Facility: HOSPITAL | Age: 63
End: 2018-03-06

## 2018-03-06 DIAGNOSIS — Z79.01 LONG TERM (CURRENT) USE OF ANTICOAGULANTS: ICD-10-CM

## 2018-03-11 LAB — INR: 2.2 (ref 0.8–1.2)

## 2018-03-12 ENCOUNTER — ANTI-COAG VISIT (OUTPATIENT)
Dept: HEMATOLOGY/ONCOLOGY | Facility: HOSPITAL | Age: 63
End: 2018-03-12

## 2018-03-12 DIAGNOSIS — Z79.01 LONG TERM (CURRENT) USE OF ANTICOAGULANTS: ICD-10-CM

## 2018-03-19 ENCOUNTER — ANTI-COAG VISIT (OUTPATIENT)
Dept: HEMATOLOGY/ONCOLOGY | Facility: HOSPITAL | Age: 63
End: 2018-03-19

## 2018-03-19 DIAGNOSIS — Z79.01 LONG TERM (CURRENT) USE OF ANTICOAGULANTS: ICD-10-CM

## 2018-03-19 LAB — INR: 2.4 (ref 0.8–1.2)

## 2018-03-26 LAB — INR: 2.6 (ref 0.8–1.2)

## 2018-03-27 ENCOUNTER — ANTI-COAG VISIT (OUTPATIENT)
Dept: HEMATOLOGY/ONCOLOGY | Facility: HOSPITAL | Age: 63
End: 2018-03-27

## 2018-03-27 DIAGNOSIS — Z79.01 LONG TERM (CURRENT) USE OF ANTICOAGULANTS: ICD-10-CM

## 2018-03-27 RX ORDER — TAMSULOSIN HYDROCHLORIDE 0.4 MG/1
CAPSULE ORAL
Qty: 60 CAPSULE | Refills: 0 | Status: SHIPPED | OUTPATIENT
Start: 2018-03-27 | End: 2018-05-17

## 2018-03-27 NOTE — TELEPHONE ENCOUNTER
Last ov was 6/22/17  Last refill tamsulosin 2/1/18    Pt is due for an annual Px.  Please call to schedule one

## 2018-04-03 ENCOUNTER — MED REC SCAN ONLY (OUTPATIENT)
Dept: HEMATOLOGY/ONCOLOGY | Facility: HOSPITAL | Age: 63
End: 2018-04-03

## 2018-04-03 ENCOUNTER — ANTI-COAG VISIT (OUTPATIENT)
Dept: HEMATOLOGY/ONCOLOGY | Facility: HOSPITAL | Age: 63
End: 2018-04-03

## 2018-04-03 DIAGNOSIS — Z79.01 LONG TERM (CURRENT) USE OF ANTICOAGULANTS: ICD-10-CM

## 2018-04-10 ENCOUNTER — ANTI-COAG VISIT (OUTPATIENT)
Dept: HEMATOLOGY/ONCOLOGY | Facility: HOSPITAL | Age: 63
End: 2018-04-10

## 2018-04-10 DIAGNOSIS — Z79.01 LONG TERM (CURRENT) USE OF ANTICOAGULANTS: ICD-10-CM

## 2018-04-23 ENCOUNTER — ANTI-COAG VISIT (OUTPATIENT)
Dept: HEMATOLOGY/ONCOLOGY | Facility: HOSPITAL | Age: 63
End: 2018-04-23

## 2018-04-23 DIAGNOSIS — Z79.01 LONG TERM (CURRENT) USE OF ANTICOAGULANTS: ICD-10-CM

## 2018-05-14 ENCOUNTER — ANTI-COAG VISIT (OUTPATIENT)
Dept: HEMATOLOGY/ONCOLOGY | Facility: HOSPITAL | Age: 63
End: 2018-05-14

## 2018-05-14 ENCOUNTER — MED REC SCAN ONLY (OUTPATIENT)
Dept: HEMATOLOGY/ONCOLOGY | Facility: HOSPITAL | Age: 63
End: 2018-05-14

## 2018-05-14 DIAGNOSIS — Z79.01 LONG TERM (CURRENT) USE OF ANTICOAGULANTS: ICD-10-CM

## 2018-05-17 ENCOUNTER — OFFICE VISIT (OUTPATIENT)
Dept: FAMILY MEDICINE CLINIC | Facility: CLINIC | Age: 63
End: 2018-05-17

## 2018-05-17 ENCOUNTER — LAB ENCOUNTER (OUTPATIENT)
Dept: LAB | Age: 63
End: 2018-05-17
Attending: FAMILY MEDICINE
Payer: COMMERCIAL

## 2018-05-17 VITALS
BODY MASS INDEX: 38.65 KG/M2 | HEIGHT: 70 IN | DIASTOLIC BLOOD PRESSURE: 88 MMHG | HEART RATE: 78 BPM | TEMPERATURE: 98 F | RESPIRATION RATE: 16 BRPM | WEIGHT: 270 LBS | SYSTOLIC BLOOD PRESSURE: 136 MMHG

## 2018-05-17 DIAGNOSIS — Z15.89 MTHFR MUTATION: ICD-10-CM

## 2018-05-17 DIAGNOSIS — Z00.00 ANNUAL PHYSICAL EXAM: Primary | ICD-10-CM

## 2018-05-17 DIAGNOSIS — Z00.00 ANNUAL PHYSICAL EXAM: ICD-10-CM

## 2018-05-17 DIAGNOSIS — Z99.89 OSA ON CPAP: ICD-10-CM

## 2018-05-17 DIAGNOSIS — I26.99 RECURRENT PULMONARY EMBOLISM (HCC): ICD-10-CM

## 2018-05-17 DIAGNOSIS — G47.33 OSA ON CPAP: ICD-10-CM

## 2018-05-17 PROCEDURE — 84153 ASSAY OF PSA TOTAL: CPT | Performed by: FAMILY MEDICINE

## 2018-05-17 PROCEDURE — 80061 LIPID PANEL: CPT | Performed by: FAMILY MEDICINE

## 2018-05-17 PROCEDURE — 82306 VITAMIN D 25 HYDROXY: CPT | Performed by: FAMILY MEDICINE

## 2018-05-17 PROCEDURE — 84439 ASSAY OF FREE THYROXINE: CPT | Performed by: FAMILY MEDICINE

## 2018-05-17 PROCEDURE — 36415 COLL VENOUS BLD VENIPUNCTURE: CPT | Performed by: FAMILY MEDICINE

## 2018-05-17 PROCEDURE — 82607 VITAMIN B-12: CPT | Performed by: FAMILY MEDICINE

## 2018-05-17 PROCEDURE — 90732 PPSV23 VACC 2 YRS+ SUBQ/IM: CPT | Performed by: FAMILY MEDICINE

## 2018-05-17 PROCEDURE — 99396 PREV VISIT EST AGE 40-64: CPT | Performed by: FAMILY MEDICINE

## 2018-05-17 PROCEDURE — 80050 GENERAL HEALTH PANEL: CPT | Performed by: FAMILY MEDICINE

## 2018-05-17 PROCEDURE — 83036 HEMOGLOBIN GLYCOSYLATED A1C: CPT | Performed by: FAMILY MEDICINE

## 2018-05-17 PROCEDURE — 90471 IMMUNIZATION ADMIN: CPT | Performed by: FAMILY MEDICINE

## 2018-05-17 RX ORDER — TAMSULOSIN HYDROCHLORIDE 0.4 MG/1
CAPSULE ORAL
Qty: 90 CAPSULE | Refills: 3 | Status: SHIPPED | OUTPATIENT
Start: 2018-05-17 | End: 2019-05-06

## 2018-05-17 NOTE — PROGRESS NOTES
Molly Newman is a 59year old male who presents for a complete physical exam.   HPI:   Pt complains of nothing.     Recurrent pulmonary embolism- sees oncology ; home INR with call in   Pt sees pulmfor LEANDRA     + calcium and vit D   + nocturia urinary sy tablet Rfl: 0   Omega-3 Fatty Acids (FISH OIL) 1200 MG Oral Cap Take 1 capsule by mouth daily. Disp:  Rfl:    aspirin 81 MG Oral Tab Take 81 mg by mouth daily.  Disp:  Rfl:       Past Medical History:   Diagnosis Date   • Blood disorder     thromboctopenia, kg/m²   Body mass index is 38.74 kg/m².    GENERAL: well developed, well nourished,in no apparent distress  SKIN: no rashes,no suspicious lesions  HEENT: atraumatic, normocephalic,ears and throat are clear  EYES:PERRLA, EOMI, normal optic disk,conjunctiva a

## 2018-05-20 ENCOUNTER — APPOINTMENT (OUTPATIENT)
Dept: GENERAL RADIOLOGY | Facility: HOSPITAL | Age: 63
End: 2018-05-20
Attending: EMERGENCY MEDICINE
Payer: COMMERCIAL

## 2018-05-20 ENCOUNTER — HOSPITAL ENCOUNTER (EMERGENCY)
Facility: HOSPITAL | Age: 63
Discharge: HOME | End: 2018-05-20
Attending: EMERGENCY MEDICINE
Payer: COMMERCIAL

## 2018-05-20 VITALS
HEART RATE: 72 BPM | TEMPERATURE: 98 F | WEIGHT: 260 LBS | SYSTOLIC BLOOD PRESSURE: 142 MMHG | BODY MASS INDEX: 37 KG/M2 | RESPIRATION RATE: 16 BRPM | OXYGEN SATURATION: 95 % | DIASTOLIC BLOOD PRESSURE: 87 MMHG

## 2018-05-20 DIAGNOSIS — M25.462 EFFUSION OF LEFT KNEE: Primary | ICD-10-CM

## 2018-05-20 PROCEDURE — 99283 EMERGENCY DEPT VISIT LOW MDM: CPT

## 2018-05-20 PROCEDURE — 73562 X-RAY EXAM OF KNEE 3: CPT | Performed by: EMERGENCY MEDICINE

## 2018-05-20 RX ORDER — TRAMADOL HYDROCHLORIDE 50 MG/1
TABLET ORAL EVERY 4 HOURS PRN
Qty: 10 TABLET | Refills: 0 | Status: SHIPPED | OUTPATIENT
Start: 2018-05-20 | End: 2018-05-27

## 2018-05-20 NOTE — ED INITIAL ASSESSMENT (HPI)
Pt here with report of tripping and falling last night and twisted and landed on right knee. Pt has had a knee replacement on that knee.

## 2018-05-20 NOTE — ED PROVIDER NOTES
Patient Seen in: [facility] Emergency Department    History   Patient presents with:  Lower Extremity Injury (musculoskeletal)    Stated Complaint: right knee pain s/p fall    HPI    71-year-old male presents for evaluation of a right knee injury. Temp 97.5 °F (36.4 °C) (Temporal)   Resp 16   Wt 117.9 kg   SpO2 95%   BMI 37.31 kg/m²         Physical Exam    Right lower extremity: Bruising and swelling over the right patella.   No significant tenderness or swelling of the right quadricep or anterior t diagnosis)    Disposition:  Discharge  5/20/2018  5:10 pm    Follow-up:  Britni Mahmood DO  [address] [facility] [place] [address]  [phone]    Call in 3 days          Medications Prescribed:  Discharge Medication List as of 5/20/2018  5:16 PM    ST

## 2018-05-22 ENCOUNTER — ANTI-COAG VISIT (OUTPATIENT)
Dept: HEMATOLOGY/ONCOLOGY | Facility: HOSPITAL | Age: 63
End: 2018-05-22

## 2018-05-22 DIAGNOSIS — Z79.01 LONG TERM (CURRENT) USE OF ANTICOAGULANTS: ICD-10-CM

## 2018-05-31 RX ORDER — WARFARIN SODIUM 5 MG/1
TABLET ORAL
Qty: 180 TABLET | Refills: 0 | Status: SHIPPED | OUTPATIENT
Start: 2018-05-31 | End: 2018-08-22

## 2018-06-04 ENCOUNTER — ANTI-COAG VISIT (OUTPATIENT)
Dept: HEMATOLOGY/ONCOLOGY | Facility: HOSPITAL | Age: 63
End: 2018-06-04

## 2018-06-04 ENCOUNTER — MED REC SCAN ONLY (OUTPATIENT)
Dept: HEMATOLOGY/ONCOLOGY | Facility: HOSPITAL | Age: 63
End: 2018-06-04

## 2018-06-04 DIAGNOSIS — Z79.01 LONG TERM (CURRENT) USE OF ANTICOAGULANTS: ICD-10-CM

## 2018-06-06 ENCOUNTER — TELEPHONE (OUTPATIENT)
Dept: HEMATOLOGY/ONCOLOGY | Facility: HOSPITAL | Age: 63
End: 2018-06-06

## 2018-06-11 ENCOUNTER — ANTI-COAG VISIT (OUTPATIENT)
Dept: HEMATOLOGY/ONCOLOGY | Facility: HOSPITAL | Age: 63
End: 2018-06-11

## 2018-06-11 DIAGNOSIS — Z79.01 LONG TERM (CURRENT) USE OF ANTICOAGULANTS: ICD-10-CM

## 2018-06-18 NOTE — TELEPHONE ENCOUNTER
Patient notified ok for topical creams. Patient to let us know what was recommended. Per patient - healing. Does not need prescription at this time.

## 2018-07-02 ENCOUNTER — ANTI-COAG VISIT (OUTPATIENT)
Dept: HEMATOLOGY/ONCOLOGY | Facility: HOSPITAL | Age: 63
End: 2018-07-02

## 2018-07-02 DIAGNOSIS — Z79.01 LONG TERM (CURRENT) USE OF ANTICOAGULANTS: ICD-10-CM

## 2018-07-02 LAB — INR: 2.3 (ref 0.8–1.2)

## 2018-07-16 ENCOUNTER — ANTI-COAG VISIT (OUTPATIENT)
Dept: HEMATOLOGY/ONCOLOGY | Facility: HOSPITAL | Age: 63
End: 2018-07-16

## 2018-07-16 DIAGNOSIS — Z79.01 LONG TERM (CURRENT) USE OF ANTICOAGULANTS: ICD-10-CM

## 2018-07-16 LAB — INR: 2.3 (ref 0.8–1.2)

## 2018-07-23 ENCOUNTER — ANTI-COAG VISIT (OUTPATIENT)
Dept: HEMATOLOGY/ONCOLOGY | Facility: HOSPITAL | Age: 63
End: 2018-07-23

## 2018-07-23 DIAGNOSIS — Z79.01 LONG TERM (CURRENT) USE OF ANTICOAGULANTS: ICD-10-CM

## 2018-07-23 LAB — INR: 2.4 (ref 0.8–1.2)

## 2018-08-06 LAB — INR: 2.7 (ref 0.8–1.2)

## 2018-08-10 ENCOUNTER — ANTI-COAG VISIT (OUTPATIENT)
Dept: HEMATOLOGY/ONCOLOGY | Facility: HOSPITAL | Age: 63
End: 2018-08-10

## 2018-08-10 ENCOUNTER — MED REC SCAN ONLY (OUTPATIENT)
Dept: HEMATOLOGY/ONCOLOGY | Facility: HOSPITAL | Age: 63
End: 2018-08-10

## 2018-08-10 DIAGNOSIS — Z79.01 LONG TERM (CURRENT) USE OF ANTICOAGULANTS: ICD-10-CM

## 2018-08-13 LAB — INR: 2.3 (ref 0.8–1.2)

## 2018-08-14 ENCOUNTER — ANTI-COAG VISIT (OUTPATIENT)
Dept: HEMATOLOGY/ONCOLOGY | Facility: HOSPITAL | Age: 63
End: 2018-08-14

## 2018-08-14 DIAGNOSIS — Z79.01 LONG TERM (CURRENT) USE OF ANTICOAGULANTS: ICD-10-CM

## 2018-08-22 RX ORDER — WARFARIN SODIUM 5 MG/1
TABLET ORAL
Qty: 180 TABLET | Refills: 0 | Status: SHIPPED | OUTPATIENT
Start: 2018-08-22 | End: 2019-05-07

## 2018-08-27 LAB — INR: 2.6 (ref 0.8–1.2)

## 2018-08-29 ENCOUNTER — ANTI-COAG VISIT (OUTPATIENT)
Dept: HEMATOLOGY/ONCOLOGY | Facility: HOSPITAL | Age: 63
End: 2018-08-29

## 2018-08-29 DIAGNOSIS — Z79.01 LONG TERM (CURRENT) USE OF ANTICOAGULANTS: ICD-10-CM

## 2018-09-24 ENCOUNTER — ANTI-COAG VISIT (OUTPATIENT)
Dept: HEMATOLOGY/ONCOLOGY | Facility: HOSPITAL | Age: 63
End: 2018-09-24

## 2018-09-24 ENCOUNTER — MED REC SCAN ONLY (OUTPATIENT)
Dept: HEMATOLOGY/ONCOLOGY | Facility: HOSPITAL | Age: 63
End: 2018-09-24

## 2018-09-24 DIAGNOSIS — Z79.01 LONG TERM (CURRENT) USE OF ANTICOAGULANTS: ICD-10-CM

## 2018-09-24 LAB — INR: 2.4 (ref 0.8–1.2)

## 2018-10-08 ENCOUNTER — ANTI-COAG VISIT (OUTPATIENT)
Dept: HEMATOLOGY/ONCOLOGY | Facility: HOSPITAL | Age: 63
End: 2018-10-08

## 2018-10-08 DIAGNOSIS — Z79.01 LONG TERM (CURRENT) USE OF ANTICOAGULANTS: ICD-10-CM

## 2018-11-09 ENCOUNTER — ANTI-COAG VISIT (OUTPATIENT)
Dept: HEMATOLOGY/ONCOLOGY | Facility: HOSPITAL | Age: 63
End: 2018-11-09

## 2018-11-09 ENCOUNTER — MED REC SCAN ONLY (OUTPATIENT)
Dept: HEMATOLOGY/ONCOLOGY | Facility: HOSPITAL | Age: 63
End: 2018-11-09

## 2018-11-09 DIAGNOSIS — Z79.01 LONG TERM (CURRENT) USE OF ANTICOAGULANTS: ICD-10-CM

## 2018-12-03 ENCOUNTER — ANTI-COAG VISIT (OUTPATIENT)
Dept: HEMATOLOGY/ONCOLOGY | Facility: HOSPITAL | Age: 63
End: 2018-12-03

## 2018-12-03 DIAGNOSIS — Z79.01 LONG TERM (CURRENT) USE OF ANTICOAGULANTS: ICD-10-CM

## 2019-01-14 LAB — INR: 2.4 (ref 0.8–1.2)

## 2019-01-16 ENCOUNTER — ANTI-COAG VISIT (OUTPATIENT)
Dept: HEMATOLOGY/ONCOLOGY | Facility: HOSPITAL | Age: 64
End: 2019-01-16

## 2019-01-16 ENCOUNTER — MED REC SCAN ONLY (OUTPATIENT)
Dept: HEMATOLOGY/ONCOLOGY | Facility: HOSPITAL | Age: 64
End: 2019-01-16

## 2019-01-16 DIAGNOSIS — Z79.01 LONG TERM (CURRENT) USE OF ANTICOAGULANTS: ICD-10-CM

## 2019-02-19 ENCOUNTER — ANTI-COAG VISIT (OUTPATIENT)
Dept: HEMATOLOGY/ONCOLOGY | Facility: HOSPITAL | Age: 64
End: 2019-02-19

## 2019-02-19 ENCOUNTER — MED REC SCAN ONLY (OUTPATIENT)
Dept: HEMATOLOGY/ONCOLOGY | Facility: HOSPITAL | Age: 64
End: 2019-02-19

## 2019-02-19 DIAGNOSIS — Z79.01 LONG TERM (CURRENT) USE OF ANTICOAGULANTS: ICD-10-CM

## 2019-02-19 LAB — INR: 2.7 (ref 0.8–1.2)

## 2019-02-28 ENCOUNTER — TELEPHONE (OUTPATIENT)
Dept: FAMILY MEDICINE CLINIC | Facility: CLINIC | Age: 64
End: 2019-02-28

## 2019-02-28 DIAGNOSIS — Z00.00 ANNUAL PHYSICAL EXAM: Primary | ICD-10-CM

## 2019-02-28 NOTE — TELEPHONE ENCOUNTER
Patient would like to do labs prior to the appt. He is coming in from out of town. Can we order labs.

## 2019-03-18 ENCOUNTER — ANTI-COAG VISIT (OUTPATIENT)
Dept: HEMATOLOGY/ONCOLOGY | Facility: HOSPITAL | Age: 64
End: 2019-03-18

## 2019-03-18 ENCOUNTER — MED REC SCAN ONLY (OUTPATIENT)
Dept: HEMATOLOGY/ONCOLOGY | Facility: HOSPITAL | Age: 64
End: 2019-03-18

## 2019-03-18 DIAGNOSIS — Z79.01 LONG TERM (CURRENT) USE OF ANTICOAGULANTS: ICD-10-CM

## 2019-03-18 LAB — INR: 2.4 (ref 0.8–1.2)

## 2019-04-23 ENCOUNTER — ANTI-COAG VISIT (OUTPATIENT)
Dept: HEMATOLOGY/ONCOLOGY | Facility: HOSPITAL | Age: 64
End: 2019-04-23

## 2019-04-23 DIAGNOSIS — Z79.01 LONG TERM (CURRENT) USE OF ANTICOAGULANTS: ICD-10-CM

## 2019-05-02 ENCOUNTER — OFFICE VISIT (OUTPATIENT)
Dept: HEMATOLOGY/ONCOLOGY | Facility: HOSPITAL | Age: 64
End: 2019-05-02
Attending: SPECIALIST
Payer: COMMERCIAL

## 2019-05-02 ENCOUNTER — LAB ENCOUNTER (OUTPATIENT)
Dept: LAB | Age: 64
End: 2019-05-02
Attending: FAMILY MEDICINE
Payer: COMMERCIAL

## 2019-05-02 ENCOUNTER — OFFICE VISIT (OUTPATIENT)
Dept: FAMILY MEDICINE CLINIC | Facility: CLINIC | Age: 64
End: 2019-05-02
Payer: COMMERCIAL

## 2019-05-02 VITALS
HEART RATE: 68 BPM | HEIGHT: 70 IN | WEIGHT: 266.81 LBS | DIASTOLIC BLOOD PRESSURE: 86 MMHG | TEMPERATURE: 98 F | OXYGEN SATURATION: 97 % | RESPIRATION RATE: 20 BRPM | BODY MASS INDEX: 38.2 KG/M2 | SYSTOLIC BLOOD PRESSURE: 131 MMHG

## 2019-05-02 VITALS
DIASTOLIC BLOOD PRESSURE: 84 MMHG | WEIGHT: 267 LBS | RESPIRATION RATE: 16 BRPM | SYSTOLIC BLOOD PRESSURE: 116 MMHG | OXYGEN SATURATION: 97 % | HEIGHT: 70 IN | BODY MASS INDEX: 38.22 KG/M2 | HEART RATE: 74 BPM | TEMPERATURE: 99 F

## 2019-05-02 DIAGNOSIS — R73.03 PREDIABETES: ICD-10-CM

## 2019-05-02 DIAGNOSIS — G47.33 OSA ON CPAP: ICD-10-CM

## 2019-05-02 DIAGNOSIS — E78.00 ELEVATED CHOLESTEROL: ICD-10-CM

## 2019-05-02 DIAGNOSIS — Z00.00 ANNUAL PHYSICAL EXAM: ICD-10-CM

## 2019-05-02 DIAGNOSIS — Z99.89 OSA ON CPAP: ICD-10-CM

## 2019-05-02 DIAGNOSIS — I26.99 RECURRENT PULMONARY EMBOLISM (HCC): ICD-10-CM

## 2019-05-02 DIAGNOSIS — D69.6 THROMBOCYTOPENIA (HCC): Primary | ICD-10-CM

## 2019-05-02 DIAGNOSIS — D69.6 THROMBOCYTOPENIA (HCC): ICD-10-CM

## 2019-05-02 DIAGNOSIS — Z15.89 MTHFR MUTATION: ICD-10-CM

## 2019-05-02 DIAGNOSIS — Z00.00 ANNUAL PHYSICAL EXAM: Primary | ICD-10-CM

## 2019-05-02 DIAGNOSIS — Z79.01 LONG TERM (CURRENT) USE OF ANTICOAGULANTS: ICD-10-CM

## 2019-05-02 DIAGNOSIS — R74.8 ELEVATED LIVER ENZYMES: ICD-10-CM

## 2019-05-02 PROCEDURE — 82306 VITAMIN D 25 HYDROXY: CPT | Performed by: FAMILY MEDICINE

## 2019-05-02 PROCEDURE — 82607 VITAMIN B-12: CPT | Performed by: FAMILY MEDICINE

## 2019-05-02 PROCEDURE — 90471 IMMUNIZATION ADMIN: CPT | Performed by: FAMILY MEDICINE

## 2019-05-02 PROCEDURE — 80050 GENERAL HEALTH PANEL: CPT | Performed by: FAMILY MEDICINE

## 2019-05-02 PROCEDURE — 83036 HEMOGLOBIN GLYCOSYLATED A1C: CPT | Performed by: FAMILY MEDICINE

## 2019-05-02 PROCEDURE — 99214 OFFICE O/P EST MOD 30 MIN: CPT | Performed by: SPECIALIST

## 2019-05-02 PROCEDURE — 84153 ASSAY OF PSA TOTAL: CPT | Performed by: FAMILY MEDICINE

## 2019-05-02 PROCEDURE — 84439 ASSAY OF FREE THYROXINE: CPT | Performed by: FAMILY MEDICINE

## 2019-05-02 PROCEDURE — 80061 LIPID PANEL: CPT | Performed by: FAMILY MEDICINE

## 2019-05-02 PROCEDURE — 99396 PREV VISIT EST AGE 40-64: CPT | Performed by: FAMILY MEDICINE

## 2019-05-02 PROCEDURE — 90715 TDAP VACCINE 7 YRS/> IM: CPT | Performed by: FAMILY MEDICINE

## 2019-05-02 PROCEDURE — 36415 COLL VENOUS BLD VENIPUNCTURE: CPT | Performed by: FAMILY MEDICINE

## 2019-05-02 NOTE — PROGRESS NOTES
Wiliam Sotelo is a 62year old male who presents for a complete physical exam.   HPI:   Pt complains of nothing.     Recurrent pulmonary embolism- sees oncology ; home INR with call in   Pt sees pulmfor LEANDRA     + calcium and vit D   + nocturia urinary sy Take 1 capsule by mouth daily. Disp:  Rfl:    aspirin 81 MG Oral Tab Take 81 mg by mouth daily.  Disp:  Rfl:       Past Medical History:   Diagnosis Date   • Blood disorder     thromboctopenia, polycthemia vera   • Calculus of kidney    • Deep vein thrombos denies headaches  PSYCHE: denies depression or anxiety  HEMATOLOGIC: denies hx of anemia  ENDOCRINE: denies thyroid history  ALL/ASTHMA: denies hx of allergy or asthma    EXAM:   /84   Pulse 74   Temp 98.6 °F (37 °C) (Oral)   Resp 16   Ht 70\"   Wt 2

## 2019-05-02 NOTE — PROGRESS NOTES
Patient is here today for follow up with Lila Millard for Anticoagulation. Currently on Warfarin Therapy. Patient denies pain. Stated he is feeling good. Medication list and medical history were reviewed and updated.      Education Record    Learner:  Karley

## 2019-05-04 NOTE — PROGRESS NOTES
[facility] Progress Note      Patient Name: Hal Montero   YOB: 1955  Medical Record Number: [id number]  Attending Physician: Maria Stockton M.D.      Date of Visit: 5/2/2019      Chief Complaint  Pulmonary embolism, polycythem hematopoiesis including megakaryopoiesis no significant increase in reticulin fibrosis.   Cytogenetics were normal.    On 11/21/2012 patient presented to the hospital with new shortness of breath and was found to have bilateral pulmonary embolism and left l Omega-3 Fatty Acids (FISH OIL) 1200 MG Oral Cap Take 1 capsule by mouth daily. Disp:  Rfl:    aspirin 81 MG Oral Tab Take 81 mg by mouth daily. Disp:  Rfl:      Allergies   Mr. Lul Rooney has No Known Allergies.        Review of Systems   Constitutional No fe Collection Time: 05/02/19  8:57 AM   Result Value Ref Range    Cholesterol, Total 217 (H) <200 mg/dL    HDL Cholesterol 49 40 - 59 mg/dL    Triglycerides 193 (H) 30 - 149 mg/dL    LDL Cholesterol 129 (H) <100 mg/dL    VLDL 39 (H) 0 - 30 mg/dL    Non HDL Ch - 15.0 %    RDW-SD 50.7 (H) 35.1 - 46.3 fL    Neutrophil Absolute Prelim 2.52 1.50 - 7.70 x10 (3) uL    Neutrophil Absolute 2.52 1.50 - 7.70 x10(3) uL    Lymphocyte Absolute 1.95 1.00 - 4.00 x10(3) uL    Monocyte Absolute 0.42 0.10 - 1.00 x10(3) uL    Eosi

## 2019-05-06 RX ORDER — TAMSULOSIN HYDROCHLORIDE 0.4 MG/1
CAPSULE ORAL
Qty: 90 CAPSULE | Refills: 3 | Status: SHIPPED | OUTPATIENT
Start: 2019-05-06 | End: 2020-07-09

## 2019-05-07 RX ORDER — WARFARIN SODIUM 5 MG/1
TABLET ORAL
Qty: 180 TABLET | Refills: 0 | Status: SHIPPED | OUTPATIENT
Start: 2019-05-07 | End: 2019-07-30

## 2019-06-24 ENCOUNTER — ANTI-COAG VISIT (OUTPATIENT)
Dept: HEMATOLOGY/ONCOLOGY | Facility: HOSPITAL | Age: 64
End: 2019-06-24

## 2019-06-24 DIAGNOSIS — Z79.01 LONG TERM (CURRENT) USE OF ANTICOAGULANTS: ICD-10-CM

## 2019-07-30 ENCOUNTER — ANTI-COAG VISIT (OUTPATIENT)
Dept: HEMATOLOGY/ONCOLOGY | Facility: HOSPITAL | Age: 64
End: 2019-07-30

## 2019-07-30 DIAGNOSIS — Z79.01 LONG TERM (CURRENT) USE OF ANTICOAGULANTS: ICD-10-CM

## 2019-07-30 LAB — INR: 2.4 (ref 0.8–1.2)

## 2019-07-30 RX ORDER — WARFARIN SODIUM 5 MG/1
TABLET ORAL
Qty: 180 TABLET | Refills: 0 | Status: SHIPPED | OUTPATIENT
Start: 2019-07-30 | End: 2020-07-09

## 2019-08-05 ENCOUNTER — APPOINTMENT (OUTPATIENT)
Dept: HEMATOLOGY/ONCOLOGY | Facility: HOSPITAL | Age: 64
End: 2019-08-05
Attending: SPECIALIST
Payer: COMMERCIAL

## 2019-10-01 ENCOUNTER — ANTI-COAG VISIT (OUTPATIENT)
Dept: HEMATOLOGY/ONCOLOGY | Facility: HOSPITAL | Age: 64
End: 2019-10-01

## 2019-10-01 DIAGNOSIS — Z79.01 LONG TERM (CURRENT) USE OF ANTICOAGULANTS: ICD-10-CM

## 2019-10-28 ENCOUNTER — ANTI-COAG VISIT (OUTPATIENT)
Dept: HEMATOLOGY/ONCOLOGY | Facility: HOSPITAL | Age: 64
End: 2019-10-28

## 2019-10-28 DIAGNOSIS — Z79.01 LONG TERM (CURRENT) USE OF ANTICOAGULANTS: ICD-10-CM

## 2019-11-15 ENCOUNTER — ANTI-COAG VISIT (OUTPATIENT)
Dept: HEMATOLOGY/ONCOLOGY | Facility: HOSPITAL | Age: 64
End: 2019-11-15

## 2019-11-15 DIAGNOSIS — Z79.01 LONG TERM (CURRENT) USE OF ANTICOAGULANTS: ICD-10-CM

## 2019-11-25 ENCOUNTER — ANTI-COAG VISIT (OUTPATIENT)
Dept: HEMATOLOGY/ONCOLOGY | Facility: HOSPITAL | Age: 64
End: 2019-11-25

## 2019-11-25 DIAGNOSIS — Z79.01 LONG TERM (CURRENT) USE OF ANTICOAGULANTS: ICD-10-CM

## 2019-12-05 ENCOUNTER — ANTI-COAG VISIT (OUTPATIENT)
Dept: HEMATOLOGY/ONCOLOGY | Facility: HOSPITAL | Age: 64
End: 2019-12-05

## 2019-12-05 DIAGNOSIS — Z79.01 LONG TERM (CURRENT) USE OF ANTICOAGULANTS: ICD-10-CM

## 2019-12-16 ENCOUNTER — ANTI-COAG VISIT (OUTPATIENT)
Dept: HEMATOLOGY/ONCOLOGY | Facility: HOSPITAL | Age: 64
End: 2019-12-16

## 2019-12-16 DIAGNOSIS — Z79.01 LONG TERM (CURRENT) USE OF ANTICOAGULANTS: ICD-10-CM

## 2020-01-02 ENCOUNTER — ANTI-COAG VISIT (OUTPATIENT)
Dept: HEMATOLOGY/ONCOLOGY | Facility: HOSPITAL | Age: 65
End: 2020-01-02

## 2020-01-02 DIAGNOSIS — Z79.01 LONG TERM (CURRENT) USE OF ANTICOAGULANTS: ICD-10-CM

## 2020-01-02 LAB — INR: 2.4 (ref 0.8–1.2)

## 2020-01-20 ENCOUNTER — ANTI-COAG VISIT (OUTPATIENT)
Dept: HEMATOLOGY/ONCOLOGY | Facility: HOSPITAL | Age: 65
End: 2020-01-20

## 2020-01-20 DIAGNOSIS — Z79.01 LONG TERM (CURRENT) USE OF ANTICOAGULANTS: ICD-10-CM

## 2020-01-20 LAB — INR: 2.7 (ref 0.8–1.2)

## 2020-01-22 ENCOUNTER — TELEPHONE (OUTPATIENT)
Dept: HEMATOLOGY/ONCOLOGY | Facility: HOSPITAL | Age: 65
End: 2020-01-22

## 2020-01-22 DIAGNOSIS — Z79.01 LONG TERM (CURRENT) USE OF ANTICOAGULANTS: Primary | ICD-10-CM

## 2020-01-22 DIAGNOSIS — I26.99 RECURRENT PULMONARY EMBOLISM (HCC): ICD-10-CM

## 2020-01-22 DIAGNOSIS — D69.6 THROMBOCYTOPENIA (HCC): ICD-10-CM

## 2020-01-22 NOTE — TELEPHONE ENCOUNTER
MD Samantha Marinelli RN   Caller: Unspecified (Today, 10:53 AM)             OK w me. Just tell me what I need to sign. Patient notified  --  Written prescription for lab draws mailed to patient home.

## 2020-01-22 NOTE — TELEPHONE ENCOUNTER
Patient called stated it is costing him 90.00 per week for INR testing with his at home device.   Patient would like to know if we can send him a script for standing order for INR,  so he can have Testing at an Out Patient facility and have results faxed to

## 2020-01-22 NOTE — TELEPHONE ENCOUNTER
Willian Guan called to speak with [facility] regarding his weekly testing.  Please call

## 2020-01-30 ENCOUNTER — ANTI-COAG VISIT (OUTPATIENT)
Dept: HEMATOLOGY/ONCOLOGY | Facility: HOSPITAL | Age: 65
End: 2020-01-30

## 2020-01-30 DIAGNOSIS — Z79.01 LONG TERM (CURRENT) USE OF ANTICOAGULANTS: ICD-10-CM

## 2020-01-30 LAB — INR: 2.5 (ref 0.8–1.2)

## 2020-02-10 ENCOUNTER — ANTI-COAG VISIT (OUTPATIENT)
Dept: HEMATOLOGY/ONCOLOGY | Facility: HOSPITAL | Age: 65
End: 2020-02-10

## 2020-02-10 DIAGNOSIS — Z79.01 LONG TERM (CURRENT) USE OF ANTICOAGULANTS: ICD-10-CM

## 2020-02-10 LAB — INR: 2.3 (ref 0.8–1.2)

## 2020-02-20 ENCOUNTER — ANTI-COAG VISIT (OUTPATIENT)
Dept: HEMATOLOGY/ONCOLOGY | Facility: HOSPITAL | Age: 65
End: 2020-02-20

## 2020-02-20 DIAGNOSIS — Z79.01 LONG TERM (CURRENT) USE OF ANTICOAGULANTS: ICD-10-CM

## 2020-02-20 LAB — INR: 2.8 (ref 0.8–1.2)

## 2020-02-26 ENCOUNTER — ANTI-COAG VISIT (OUTPATIENT)
Dept: HEMATOLOGY/ONCOLOGY | Facility: HOSPITAL | Age: 65
End: 2020-02-26

## 2020-02-26 DIAGNOSIS — Z79.01 LONG TERM (CURRENT) USE OF ANTICOAGULANTS: ICD-10-CM

## 2020-02-26 LAB — INR: 2.3 (ref 0.8–1.2)

## 2020-03-09 ENCOUNTER — ANTI-COAG VISIT (OUTPATIENT)
Dept: HEMATOLOGY/ONCOLOGY | Facility: HOSPITAL | Age: 65
End: 2020-03-09

## 2020-03-09 DIAGNOSIS — Z79.01 LONG TERM (CURRENT) USE OF ANTICOAGULANTS: ICD-10-CM

## 2020-03-09 LAB — INR: 2.3 (ref 0.8–1.2)

## 2020-03-16 ENCOUNTER — ANTI-COAG VISIT (OUTPATIENT)
Dept: HEMATOLOGY/ONCOLOGY | Facility: HOSPITAL | Age: 65
End: 2020-03-16

## 2020-03-16 DIAGNOSIS — Z79.01 LONG TERM (CURRENT) USE OF ANTICOAGULANTS: ICD-10-CM

## 2020-03-16 LAB — INR: 2.5 (ref 0.8–1.2)

## 2020-03-31 ENCOUNTER — ANTI-COAG VISIT (OUTPATIENT)
Dept: HEMATOLOGY/ONCOLOGY | Facility: HOSPITAL | Age: 65
End: 2020-03-31

## 2020-03-31 DIAGNOSIS — Z79.01 LONG TERM (CURRENT) USE OF ANTICOAGULANTS: ICD-10-CM

## 2020-03-31 LAB — INR: 2.6 (ref 0.8–1.2)

## 2020-04-06 ENCOUNTER — ANTI-COAG VISIT (OUTPATIENT)
Dept: HEMATOLOGY/ONCOLOGY | Facility: HOSPITAL | Age: 65
End: 2020-04-06

## 2020-04-06 DIAGNOSIS — Z79.01 LONG TERM (CURRENT) USE OF ANTICOAGULANTS: ICD-10-CM

## 2020-04-14 ENCOUNTER — ANTI-COAG VISIT (OUTPATIENT)
Dept: HEMATOLOGY/ONCOLOGY | Facility: HOSPITAL | Age: 65
End: 2020-04-14

## 2020-04-14 DIAGNOSIS — Z79.01 LONG TERM (CURRENT) USE OF ANTICOAGULANTS: ICD-10-CM

## 2020-04-21 ENCOUNTER — ANTI-COAG VISIT (OUTPATIENT)
Dept: HEMATOLOGY/ONCOLOGY | Facility: HOSPITAL | Age: 65
End: 2020-04-21

## 2020-04-21 DIAGNOSIS — Z79.01 LONG TERM (CURRENT) USE OF ANTICOAGULANTS: ICD-10-CM

## 2020-04-29 ENCOUNTER — ANTI-COAG VISIT (OUTPATIENT)
Dept: HEMATOLOGY/ONCOLOGY | Facility: HOSPITAL | Age: 65
End: 2020-04-29

## 2020-04-29 DIAGNOSIS — Z79.01 LONG TERM (CURRENT) USE OF ANTICOAGULANTS: ICD-10-CM

## 2020-05-05 ENCOUNTER — ANTI-COAG VISIT (OUTPATIENT)
Dept: HEMATOLOGY/ONCOLOGY | Facility: HOSPITAL | Age: 65
End: 2020-05-05

## 2020-05-05 DIAGNOSIS — Z79.01 LONG TERM (CURRENT) USE OF ANTICOAGULANTS: ICD-10-CM

## 2020-05-11 ENCOUNTER — ANTI-COAG VISIT (OUTPATIENT)
Dept: HEMATOLOGY/ONCOLOGY | Facility: HOSPITAL | Age: 65
End: 2020-05-11

## 2020-05-11 DIAGNOSIS — Z79.01 LONG TERM (CURRENT) USE OF ANTICOAGULANTS: ICD-10-CM

## 2020-05-20 ENCOUNTER — ANTI-COAG VISIT (OUTPATIENT)
Dept: HEMATOLOGY/ONCOLOGY | Facility: HOSPITAL | Age: 65
End: 2020-05-20

## 2020-05-20 DIAGNOSIS — Z79.01 LONG TERM (CURRENT) USE OF ANTICOAGULANTS: ICD-10-CM

## 2020-05-27 ENCOUNTER — ANTI-COAG VISIT (OUTPATIENT)
Dept: HEMATOLOGY/ONCOLOGY | Facility: HOSPITAL | Age: 65
End: 2020-05-27

## 2020-05-27 DIAGNOSIS — Z79.01 LONG TERM (CURRENT) USE OF ANTICOAGULANTS: ICD-10-CM

## 2020-06-01 ENCOUNTER — ANTI-COAG VISIT (OUTPATIENT)
Dept: HEMATOLOGY/ONCOLOGY | Facility: HOSPITAL | Age: 65
End: 2020-06-01

## 2020-06-01 DIAGNOSIS — Z79.01 LONG TERM (CURRENT) USE OF ANTICOAGULANTS: ICD-10-CM

## 2020-06-08 ENCOUNTER — ANTI-COAG VISIT (OUTPATIENT)
Dept: HEMATOLOGY/ONCOLOGY | Facility: HOSPITAL | Age: 65
End: 2020-06-08

## 2020-06-08 DIAGNOSIS — Z79.01 LONG TERM (CURRENT) USE OF ANTICOAGULANTS: ICD-10-CM

## 2020-06-16 ENCOUNTER — ANTI-COAG VISIT (OUTPATIENT)
Dept: HEMATOLOGY/ONCOLOGY | Facility: HOSPITAL | Age: 65
End: 2020-06-16

## 2020-06-16 DIAGNOSIS — Z79.01 LONG TERM (CURRENT) USE OF ANTICOAGULANTS: ICD-10-CM

## 2020-06-23 ENCOUNTER — ANTI-COAG VISIT (OUTPATIENT)
Dept: HEMATOLOGY/ONCOLOGY | Facility: HOSPITAL | Age: 65
End: 2020-06-23

## 2020-06-23 DIAGNOSIS — Z79.01 LONG TERM (CURRENT) USE OF ANTICOAGULANTS: ICD-10-CM

## 2020-06-29 ENCOUNTER — ANTI-COAG VISIT (OUTPATIENT)
Dept: HEMATOLOGY/ONCOLOGY | Facility: HOSPITAL | Age: 65
End: 2020-06-29

## 2020-06-29 DIAGNOSIS — Z79.01 LONG TERM (CURRENT) USE OF ANTICOAGULANTS: ICD-10-CM

## 2020-07-06 ENCOUNTER — ANTI-COAG VISIT (OUTPATIENT)
Dept: HEMATOLOGY/ONCOLOGY | Facility: HOSPITAL | Age: 65
End: 2020-07-06

## 2020-07-06 DIAGNOSIS — Z79.01 LONG TERM (CURRENT) USE OF ANTICOAGULANTS: ICD-10-CM

## 2020-07-06 LAB — INR: 2.5 (ref 0.8–1.2)

## 2020-07-09 RX ORDER — TAMSULOSIN HYDROCHLORIDE 0.4 MG/1
CAPSULE ORAL
Qty: 30 CAPSULE | Refills: 0 | Status: SHIPPED | OUTPATIENT
Start: 2020-07-09

## 2020-07-09 RX ORDER — WARFARIN SODIUM 5 MG/1
TABLET ORAL
Qty: 60 TABLET | Refills: 0 | Status: SHIPPED | OUTPATIENT
Start: 2020-07-09 | End: 2020-09-08

## 2020-07-09 NOTE — TELEPHONE ENCOUNTER
Requesting Tamsulosin   LOV: 5/2/19 CPE   Filled: 5/6/19 #90 with 3 refills    No future appointments.     Refilled x 30 days and asked patient to schedule CPE

## 2020-07-10 ENCOUNTER — TELEPHONE (OUTPATIENT)
Dept: HEMATOLOGY/ONCOLOGY | Facility: HOSPITAL | Age: 65
End: 2020-07-10

## 2020-07-13 ENCOUNTER — ANTI-COAG VISIT (OUTPATIENT)
Dept: HEMATOLOGY/ONCOLOGY | Facility: HOSPITAL | Age: 65
End: 2020-07-13

## 2020-07-13 DIAGNOSIS — Z79.01 LONG TERM (CURRENT) USE OF ANTICOAGULANTS: ICD-10-CM

## 2020-07-13 LAB — INR: 2.7 (ref 0.8–1.2)

## 2020-07-20 ENCOUNTER — ANTI-COAG VISIT (OUTPATIENT)
Dept: HEMATOLOGY/ONCOLOGY | Facility: HOSPITAL | Age: 65
End: 2020-07-20

## 2020-07-20 DIAGNOSIS — Z79.01 LONG TERM (CURRENT) USE OF ANTICOAGULANTS: ICD-10-CM

## 2020-07-20 LAB — INR: 2.7 (ref 0.8–1.2)

## 2020-07-27 ENCOUNTER — ANTI-COAG VISIT (OUTPATIENT)
Dept: HEMATOLOGY/ONCOLOGY | Facility: HOSPITAL | Age: 65
End: 2020-07-27

## 2020-07-27 DIAGNOSIS — Z79.01 LONG TERM (CURRENT) USE OF ANTICOAGULANTS: ICD-10-CM

## 2020-07-27 LAB — INR: 2.6 (ref 0.8–1.2)

## 2020-08-04 LAB — INR: 2.8 (ref 0.8–1.2)

## 2020-08-05 ENCOUNTER — ANTI-COAG VISIT (OUTPATIENT)
Dept: HEMATOLOGY/ONCOLOGY | Facility: HOSPITAL | Age: 65
End: 2020-08-05

## 2020-08-05 DIAGNOSIS — Z79.01 LONG TERM (CURRENT) USE OF ANTICOAGULANTS: ICD-10-CM

## 2020-08-11 ENCOUNTER — ANTI-COAG VISIT (OUTPATIENT)
Dept: HEMATOLOGY/ONCOLOGY | Facility: HOSPITAL | Age: 65
End: 2020-08-11

## 2020-08-11 DIAGNOSIS — Z79.01 LONG TERM (CURRENT) USE OF ANTICOAGULANTS: ICD-10-CM

## 2020-08-11 LAB — INR: 2.3 (ref 0.8–1.2)

## 2020-08-17 ENCOUNTER — ANTI-COAG VISIT (OUTPATIENT)
Dept: HEMATOLOGY/ONCOLOGY | Facility: HOSPITAL | Age: 65
End: 2020-08-17

## 2020-08-17 DIAGNOSIS — Z79.01 LONG TERM (CURRENT) USE OF ANTICOAGULANTS: ICD-10-CM

## 2020-08-17 LAB — INR: 2.6 (ref 0.8–1.2)

## 2020-08-24 ENCOUNTER — ANTI-COAG VISIT (OUTPATIENT)
Dept: HEMATOLOGY/ONCOLOGY | Facility: HOSPITAL | Age: 65
End: 2020-08-24

## 2020-08-24 DIAGNOSIS — Z79.01 LONG TERM (CURRENT) USE OF ANTICOAGULANTS: ICD-10-CM

## 2020-08-24 LAB — INR: 2.7 (ref 0.8–1.2)

## 2020-08-31 ENCOUNTER — ANTI-COAG VISIT (OUTPATIENT)
Dept: HEMATOLOGY/ONCOLOGY | Facility: HOSPITAL | Age: 65
End: 2020-08-31

## 2020-08-31 DIAGNOSIS — Z79.01 LONG TERM (CURRENT) USE OF ANTICOAGULANTS: ICD-10-CM

## 2020-08-31 LAB — INR: 2.7 (ref 0.8–1.2)

## 2020-09-08 ENCOUNTER — ANTI-COAG VISIT (OUTPATIENT)
Dept: HEMATOLOGY/ONCOLOGY | Facility: HOSPITAL | Age: 65
End: 2020-09-08

## 2020-09-08 DIAGNOSIS — Z79.01 LONG TERM (CURRENT) USE OF ANTICOAGULANTS: ICD-10-CM

## 2020-09-08 LAB — INR: 2.5 (ref 0.8–1.2)

## 2020-09-08 RX ORDER — WARFARIN SODIUM 5 MG/1
TABLET ORAL
Qty: 60 TABLET | Refills: 0 | Status: SHIPPED | OUTPATIENT
Start: 2020-09-08 | End: 2020-11-11

## 2020-09-15 ENCOUNTER — ANTI-COAG VISIT (OUTPATIENT)
Dept: HEMATOLOGY/ONCOLOGY | Facility: HOSPITAL | Age: 65
End: 2020-09-15

## 2020-09-15 DIAGNOSIS — Z79.01 LONG TERM (CURRENT) USE OF ANTICOAGULANTS: ICD-10-CM

## 2020-09-15 LAB — INR: 2.3 (ref 0.8–1.2)

## 2020-09-21 ENCOUNTER — ANTI-COAG VISIT (OUTPATIENT)
Dept: HEMATOLOGY/ONCOLOGY | Facility: HOSPITAL | Age: 65
End: 2020-09-21

## 2020-09-21 DIAGNOSIS — Z79.01 LONG TERM (CURRENT) USE OF ANTICOAGULANTS: ICD-10-CM

## 2020-09-21 LAB — INR: 2.7 (ref 0.8–1.2)

## 2020-09-29 ENCOUNTER — ANTI-COAG VISIT (OUTPATIENT)
Dept: HEMATOLOGY/ONCOLOGY | Facility: HOSPITAL | Age: 65
End: 2020-09-29

## 2020-09-29 DIAGNOSIS — Z79.01 LONG TERM (CURRENT) USE OF ANTICOAGULANTS: ICD-10-CM

## 2020-09-29 LAB — INR: 2.5 (ref 0.8–1.2)

## 2020-10-05 ENCOUNTER — ANTI-COAG VISIT (OUTPATIENT)
Dept: HEMATOLOGY/ONCOLOGY | Facility: HOSPITAL | Age: 65
End: 2020-10-05

## 2020-10-05 DIAGNOSIS — Z79.01 LONG TERM (CURRENT) USE OF ANTICOAGULANTS: ICD-10-CM

## 2020-10-12 ENCOUNTER — ANTI-COAG VISIT (OUTPATIENT)
Dept: HEMATOLOGY/ONCOLOGY | Facility: HOSPITAL | Age: 65
End: 2020-10-12

## 2020-10-12 DIAGNOSIS — Z79.01 LONG TERM (CURRENT) USE OF ANTICOAGULANTS: ICD-10-CM

## 2020-10-19 ENCOUNTER — ANTI-COAG VISIT (OUTPATIENT)
Dept: HEMATOLOGY/ONCOLOGY | Facility: HOSPITAL | Age: 65
End: 2020-10-19

## 2020-10-19 DIAGNOSIS — Z79.01 LONG TERM (CURRENT) USE OF ANTICOAGULANTS: ICD-10-CM

## 2020-11-09 ENCOUNTER — ANTI-COAG VISIT (OUTPATIENT)
Dept: HEMATOLOGY/ONCOLOGY | Facility: HOSPITAL | Age: 65
End: 2020-11-09

## 2020-11-09 DIAGNOSIS — Z79.01 LONG TERM (CURRENT) USE OF ANTICOAGULANTS: ICD-10-CM

## 2020-11-11 ENCOUNTER — TELEPHONE (OUTPATIENT)
Dept: HEMATOLOGY/ONCOLOGY | Facility: HOSPITAL | Age: 65
End: 2020-11-11

## 2020-11-11 RX ORDER — WARFARIN SODIUM 5 MG/1
TABLET ORAL
Qty: 30 TABLET | Refills: 0 | Status: SHIPPED | OUTPATIENT
Start: 2020-11-11 | End: 2020-12-14

## 2020-11-16 ENCOUNTER — ANTI-COAG VISIT (OUTPATIENT)
Dept: HEMATOLOGY/ONCOLOGY | Facility: HOSPITAL | Age: 65
End: 2020-11-16

## 2020-11-16 DIAGNOSIS — Z79.01 LONG TERM (CURRENT) USE OF ANTICOAGULANTS: ICD-10-CM

## 2020-11-24 ENCOUNTER — ANTI-COAG VISIT (OUTPATIENT)
Dept: HEMATOLOGY/ONCOLOGY | Facility: HOSPITAL | Age: 65
End: 2020-11-24

## 2020-11-24 DIAGNOSIS — Z79.01 LONG TERM (CURRENT) USE OF ANTICOAGULANTS: ICD-10-CM

## 2020-12-02 ENCOUNTER — ANTI-COAG VISIT (OUTPATIENT)
Dept: HEMATOLOGY/ONCOLOGY | Facility: HOSPITAL | Age: 65
End: 2020-12-02

## 2020-12-02 DIAGNOSIS — Z79.01 LONG TERM (CURRENT) USE OF ANTICOAGULANTS: ICD-10-CM

## 2020-12-14 ENCOUNTER — ANTI-COAG VISIT (OUTPATIENT)
Dept: HEMATOLOGY/ONCOLOGY | Facility: HOSPITAL | Age: 65
End: 2020-12-14

## 2020-12-14 DIAGNOSIS — Z79.01 LONG TERM (CURRENT) USE OF ANTICOAGULANTS: ICD-10-CM

## 2020-12-14 RX ORDER — WARFARIN SODIUM 5 MG/1
TABLET ORAL
Qty: 30 TABLET | Refills: 2 | Status: SHIPPED | OUTPATIENT
Start: 2020-12-14 | End: 2021-02-16

## 2020-12-21 ENCOUNTER — ANTI-COAG VISIT (OUTPATIENT)
Dept: HEMATOLOGY/ONCOLOGY | Facility: HOSPITAL | Age: 65
End: 2020-12-21

## 2020-12-21 DIAGNOSIS — Z79.01 LONG TERM (CURRENT) USE OF ANTICOAGULANTS: ICD-10-CM

## 2020-12-28 ENCOUNTER — ANTI-COAG VISIT (OUTPATIENT)
Dept: HEMATOLOGY/ONCOLOGY | Facility: HOSPITAL | Age: 65
End: 2020-12-28

## 2020-12-28 DIAGNOSIS — Z79.01 LONG TERM (CURRENT) USE OF ANTICOAGULANTS: ICD-10-CM

## 2021-01-04 ENCOUNTER — ANTI-COAG VISIT (OUTPATIENT)
Dept: HEMATOLOGY/ONCOLOGY | Facility: HOSPITAL | Age: 66
End: 2021-01-04

## 2021-01-04 DIAGNOSIS — Z79.01 LONG TERM (CURRENT) USE OF ANTICOAGULANTS: ICD-10-CM

## 2021-01-04 LAB — INR: 2.5 (ref 0.8–1.2)

## 2021-01-12 ENCOUNTER — ANTI-COAG VISIT (OUTPATIENT)
Dept: HEMATOLOGY/ONCOLOGY | Facility: HOSPITAL | Age: 66
End: 2021-01-12

## 2021-01-12 DIAGNOSIS — Z79.01 LONG TERM (CURRENT) USE OF ANTICOAGULANTS: ICD-10-CM

## 2021-01-12 LAB — INR: 2.8 (ref 0.8–1.2)

## 2021-01-26 ENCOUNTER — ANTI-COAG VISIT (OUTPATIENT)
Dept: HEMATOLOGY/ONCOLOGY | Facility: HOSPITAL | Age: 66
End: 2021-01-26

## 2021-01-26 DIAGNOSIS — Z79.01 LONG TERM (CURRENT) USE OF ANTICOAGULANTS: ICD-10-CM

## 2021-01-26 LAB — INR: 2.5 (ref 0.8–1.2)

## 2021-02-01 ENCOUNTER — ANTI-COAG VISIT (OUTPATIENT)
Dept: HEMATOLOGY/ONCOLOGY | Facility: HOSPITAL | Age: 66
End: 2021-02-01

## 2021-02-01 DIAGNOSIS — Z79.01 LONG TERM (CURRENT) USE OF ANTICOAGULANTS: ICD-10-CM

## 2021-02-01 LAB — INR: 2.7 (ref 0.8–1.2)

## 2021-02-16 ENCOUNTER — ANTI-COAG VISIT (OUTPATIENT)
Dept: HEMATOLOGY/ONCOLOGY | Facility: HOSPITAL | Age: 66
End: 2021-02-16

## 2021-02-16 DIAGNOSIS — Z79.01 LONG TERM (CURRENT) USE OF ANTICOAGULANTS: ICD-10-CM

## 2021-02-16 LAB — INR: 2.6 (ref 0.8–1.2)

## 2021-02-16 RX ORDER — WARFARIN SODIUM 5 MG/1
5 TABLET ORAL DAILY
Qty: 90 TABLET | Refills: 1 | Status: SHIPPED | OUTPATIENT
Start: 2021-02-16 | End: 2021-04-23

## 2021-03-01 ENCOUNTER — ANTI-COAG VISIT (OUTPATIENT)
Dept: HEMATOLOGY/ONCOLOGY | Facility: HOSPITAL | Age: 66
End: 2021-03-01

## 2021-03-01 DIAGNOSIS — Z79.01 LONG TERM (CURRENT) USE OF ANTICOAGULANTS: ICD-10-CM

## 2021-03-02 LAB — INR: 2.3 (ref 0.8–1.2)

## 2021-03-22 ENCOUNTER — ANTI-COAG VISIT (OUTPATIENT)
Dept: HEMATOLOGY/ONCOLOGY | Facility: HOSPITAL | Age: 66
End: 2021-03-22

## 2021-03-22 DIAGNOSIS — Z79.01 LONG TERM (CURRENT) USE OF ANTICOAGULANTS: ICD-10-CM

## 2021-03-23 LAB
INR: 2.3 (ref 0.8–1.2)
INR: 2.8 (ref 0.8–1.2)

## 2021-03-25 ENCOUNTER — ANTI-COAG VISIT (OUTPATIENT)
Dept: HEMATOLOGY/ONCOLOGY | Facility: HOSPITAL | Age: 66
End: 2021-03-25

## 2021-03-25 DIAGNOSIS — Z79.01 LONG TERM (CURRENT) USE OF ANTICOAGULANTS: ICD-10-CM

## 2021-03-30 ENCOUNTER — ANTI-COAG VISIT (OUTPATIENT)
Dept: HEMATOLOGY/ONCOLOGY | Facility: HOSPITAL | Age: 66
End: 2021-03-30

## 2021-03-30 DIAGNOSIS — Z79.01 LONG TERM (CURRENT) USE OF ANTICOAGULANTS: ICD-10-CM

## 2021-03-30 LAB — INR: 2.6 (ref 0.8–1.2)

## 2021-04-13 ENCOUNTER — ANTI-COAG VISIT (OUTPATIENT)
Dept: HEMATOLOGY/ONCOLOGY | Facility: HOSPITAL | Age: 66
End: 2021-04-13

## 2021-04-13 DIAGNOSIS — Z79.01 LONG TERM (CURRENT) USE OF ANTICOAGULANTS: ICD-10-CM

## 2021-04-23 ENCOUNTER — TELEPHONE (OUTPATIENT)
Dept: HEMATOLOGY/ONCOLOGY | Facility: HOSPITAL | Age: 66
End: 2021-04-23

## 2021-04-23 RX ORDER — WARFARIN SODIUM 5 MG/1
5 TABLET ORAL DAILY
Qty: 90 TABLET | Refills: 1 | Status: SHIPPED | OUTPATIENT
Start: 2021-04-23 | End: 2021-10-14

## 2021-04-23 NOTE — TELEPHONE ENCOUNTER
Patient called and wanted to get his Warfarin prescription changed to a 90 day supply instead of the 15 day supply he's been getting.

## 2021-04-27 ENCOUNTER — ANTI-COAG VISIT (OUTPATIENT)
Dept: HEMATOLOGY/ONCOLOGY | Facility: HOSPITAL | Age: 66
End: 2021-04-27

## 2021-04-27 DIAGNOSIS — Z79.01 LONG TERM (CURRENT) USE OF ANTICOAGULANTS: ICD-10-CM

## 2021-05-03 ENCOUNTER — ANTI-COAG VISIT (OUTPATIENT)
Dept: HEMATOLOGY/ONCOLOGY | Facility: HOSPITAL | Age: 66
End: 2021-05-03

## 2021-05-11 ENCOUNTER — ANTI-COAG VISIT (OUTPATIENT)
Dept: HEMATOLOGY/ONCOLOGY | Facility: HOSPITAL | Age: 66
End: 2021-05-11

## 2021-05-11 DIAGNOSIS — Z79.01 LONG TERM (CURRENT) USE OF ANTICOAGULANTS: ICD-10-CM

## 2021-05-18 ENCOUNTER — ANTI-COAG VISIT (OUTPATIENT)
Dept: HEMATOLOGY/ONCOLOGY | Facility: HOSPITAL | Age: 66
End: 2021-05-18

## 2021-05-18 DIAGNOSIS — Z79.01 LONG TERM (CURRENT) USE OF ANTICOAGULANTS: ICD-10-CM

## 2021-05-26 ENCOUNTER — ANTI-COAG VISIT (OUTPATIENT)
Dept: HEMATOLOGY/ONCOLOGY | Facility: HOSPITAL | Age: 66
End: 2021-05-26

## 2021-05-26 DIAGNOSIS — Z79.01 LONG TERM (CURRENT) USE OF ANTICOAGULANTS: ICD-10-CM

## 2021-06-01 ENCOUNTER — ANTI-COAG VISIT (OUTPATIENT)
Dept: HEMATOLOGY/ONCOLOGY | Facility: HOSPITAL | Age: 66
End: 2021-06-01

## 2021-06-01 DIAGNOSIS — Z79.01 LONG TERM (CURRENT) USE OF ANTICOAGULANTS: ICD-10-CM

## 2021-06-08 ENCOUNTER — ANTI-COAG VISIT (OUTPATIENT)
Dept: HEMATOLOGY/ONCOLOGY | Facility: HOSPITAL | Age: 66
End: 2021-06-08

## 2021-06-08 DIAGNOSIS — Z79.01 LONG TERM (CURRENT) USE OF ANTICOAGULANTS: ICD-10-CM

## 2021-06-14 ENCOUNTER — ANTI-COAG VISIT (OUTPATIENT)
Dept: HEMATOLOGY/ONCOLOGY | Facility: HOSPITAL | Age: 66
End: 2021-06-14

## 2021-06-14 DIAGNOSIS — Z79.01 LONG TERM (CURRENT) USE OF ANTICOAGULANTS: ICD-10-CM

## 2021-06-17 ENCOUNTER — OFFICE VISIT (OUTPATIENT)
Dept: HEMATOLOGY/ONCOLOGY | Facility: HOSPITAL | Age: 66
End: 2021-06-17
Attending: SPECIALIST
Payer: MEDICARE

## 2021-06-17 ENCOUNTER — TELEPHONE (OUTPATIENT)
Dept: HEMATOLOGY/ONCOLOGY | Facility: HOSPITAL | Age: 66
End: 2021-06-17

## 2021-06-17 VITALS
OXYGEN SATURATION: 97 % | BODY MASS INDEX: 39.08 KG/M2 | HEART RATE: 72 BPM | RESPIRATION RATE: 16 BRPM | WEIGHT: 273 LBS | DIASTOLIC BLOOD PRESSURE: 109 MMHG | SYSTOLIC BLOOD PRESSURE: 168 MMHG | HEIGHT: 70 IN | TEMPERATURE: 97 F

## 2021-06-17 DIAGNOSIS — D69.6 THROMBOCYTOPENIA (HCC): ICD-10-CM

## 2021-06-17 DIAGNOSIS — R79.89 ABNORMAL LFTS: ICD-10-CM

## 2021-06-17 DIAGNOSIS — Z79.01 LONG TERM (CURRENT) USE OF ANTICOAGULANTS: Primary | ICD-10-CM

## 2021-06-17 DIAGNOSIS — I26.99 RECURRENT PULMONARY EMBOLISM (HCC): ICD-10-CM

## 2021-06-17 PROCEDURE — 99214 OFFICE O/P EST MOD 30 MIN: CPT | Performed by: SPECIALIST

## 2021-06-17 NOTE — PROGRESS NOTES
Patient is here today for follow up with Olga Shearing for HX Recurrent DVT Annual follow up - currently on Warfarin therapy. Patient denies pain. Stated he is feeling good. Medication list and medical history were reviewed and updated.      Education Record

## 2021-06-17 NOTE — PROGRESS NOTES
[facility] Progress Note      Patient Name: Keiry Calvert   YOB: 1955  Medical Record Number: [id number]  Attending Physician: Clementina Rivera. Sherin Mcdaniel M.D.      Date of Visit: 6/17/2021      Chief Complaint  Pulmonary embolis hematopoiesis including megakaryopoiesis no significant increase in reticulin fibrosis.   Cytogenetics were normal.    On 11/21/2012 patient presented to the hospital with new shortness of breath and was found to have bilateral pulmonary embolism and left l Rfl: POTASSIUM OR, Take 1 tablet by mouth daily. , Disp: , Rfl:   Omega-3 Fatty Acids (FISH OIL) 1200 MG Oral Cap, Take 1 capsule by mouth daily. , Disp: , Rfl:       Allergies   Mr. Lisa Patel has No Known Allergies.     Vital Signs   BP (!) 168/109 (BP Locati Bilirubin, Total 1.0 0.1 - 2.0 mg/dL    Total Protein 7.6 6.4 - 8.2 g/dL    Albumin 3.8 3.4 - 5.0 g/dL    Globulin  3.8 2.8 - 4.4 g/dL    A/G Ratio 1.0 1.0 - 2.0    FASTING Patient not present    PROTHROMBIN TIME (PT)    Collection Time: 06/17/21 10:29 AM

## 2021-06-17 NOTE — TELEPHONE ENCOUNTER
Message  Received: Today  MD Gurdeep Mcdermott RN  Let him know about his INR. His liver function tests are elevated and higher than before. He had a CT scan done by someone back in 2016 which showed a degree of fatty liver.  I suspect

## 2021-06-21 ENCOUNTER — ANTI-COAG VISIT (OUTPATIENT)
Dept: HEMATOLOGY/ONCOLOGY | Facility: HOSPITAL | Age: 66
End: 2021-06-21

## 2021-06-21 DIAGNOSIS — Z79.01 LONG TERM (CURRENT) USE OF ANTICOAGULANTS: ICD-10-CM

## 2021-06-28 ENCOUNTER — ANTI-COAG VISIT (OUTPATIENT)
Dept: HEMATOLOGY/ONCOLOGY | Facility: HOSPITAL | Age: 66
End: 2021-06-28

## 2021-06-28 DIAGNOSIS — Z79.01 LONG TERM (CURRENT) USE OF ANTICOAGULANTS: ICD-10-CM

## 2021-07-06 ENCOUNTER — ANTI-COAG VISIT (OUTPATIENT)
Dept: HEMATOLOGY/ONCOLOGY | Facility: HOSPITAL | Age: 66
End: 2021-07-06

## 2021-07-06 DIAGNOSIS — Z79.01 LONG TERM (CURRENT) USE OF ANTICOAGULANTS: ICD-10-CM

## 2021-07-06 LAB — INR: 2.3 (ref 0.8–1.2)

## 2021-07-13 ENCOUNTER — ANTI-COAG VISIT (OUTPATIENT)
Dept: HEMATOLOGY/ONCOLOGY | Facility: HOSPITAL | Age: 66
End: 2021-07-13

## 2021-07-13 DIAGNOSIS — Z79.01 LONG TERM (CURRENT) USE OF ANTICOAGULANTS: ICD-10-CM

## 2021-07-13 LAB — INR: 2.6 (ref 0.8–1.2)

## 2021-07-20 ENCOUNTER — ANTI-COAG VISIT (OUTPATIENT)
Dept: HEMATOLOGY/ONCOLOGY | Facility: HOSPITAL | Age: 66
End: 2021-07-20

## 2021-07-20 DIAGNOSIS — Z79.01 LONG TERM (CURRENT) USE OF ANTICOAGULANTS: ICD-10-CM

## 2021-07-20 LAB — INR: 2.6 (ref 0.8–1.2)

## 2021-07-26 ENCOUNTER — ANTI-COAG VISIT (OUTPATIENT)
Dept: HEMATOLOGY/ONCOLOGY | Facility: HOSPITAL | Age: 66
End: 2021-07-26

## 2021-07-26 DIAGNOSIS — Z79.01 LONG TERM (CURRENT) USE OF ANTICOAGULANTS: ICD-10-CM

## 2021-07-26 LAB — INR: 2.3 (ref 0.8–1.2)

## 2021-08-02 LAB — INR: 2.6 (ref 0.8–1.2)

## 2021-08-03 ENCOUNTER — ANTI-COAG VISIT (OUTPATIENT)
Dept: HEMATOLOGY/ONCOLOGY | Facility: HOSPITAL | Age: 66
End: 2021-08-03

## 2021-08-03 DIAGNOSIS — Z79.01 LONG TERM (CURRENT) USE OF ANTICOAGULANTS: ICD-10-CM

## 2021-08-11 ENCOUNTER — ANTI-COAG VISIT (OUTPATIENT)
Dept: HEMATOLOGY/ONCOLOGY | Facility: HOSPITAL | Age: 66
End: 2021-08-11

## 2021-08-11 DIAGNOSIS — Z79.01 LONG TERM (CURRENT) USE OF ANTICOAGULANTS: ICD-10-CM

## 2021-08-11 LAB — INR: 2.5 (ref 0.8–1.2)

## 2021-08-16 ENCOUNTER — ANTI-COAG VISIT (OUTPATIENT)
Dept: HEMATOLOGY/ONCOLOGY | Facility: HOSPITAL | Age: 66
End: 2021-08-16

## 2021-08-16 DIAGNOSIS — Z79.01 LONG TERM (CURRENT) USE OF ANTICOAGULANTS: ICD-10-CM

## 2021-08-16 LAB — INR: 2.8 (ref 0.8–1.2)

## 2021-08-24 ENCOUNTER — ANTI-COAG VISIT (OUTPATIENT)
Dept: HEMATOLOGY/ONCOLOGY | Facility: HOSPITAL | Age: 66
End: 2021-08-24

## 2021-08-24 DIAGNOSIS — Z79.01 LONG TERM (CURRENT) USE OF ANTICOAGULANTS: ICD-10-CM

## 2021-08-24 LAB — INR: 2.5 (ref 0.8–1.2)

## 2021-08-30 ENCOUNTER — ANTI-COAG VISIT (OUTPATIENT)
Dept: HEMATOLOGY/ONCOLOGY | Facility: HOSPITAL | Age: 66
End: 2021-08-30

## 2021-08-30 DIAGNOSIS — Z79.01 LONG TERM (CURRENT) USE OF ANTICOAGULANTS: ICD-10-CM

## 2021-08-30 LAB — INR: 2.6 (ref 0.8–1.2)

## 2021-09-08 ENCOUNTER — ANTI-COAG VISIT (OUTPATIENT)
Dept: HEMATOLOGY/ONCOLOGY | Facility: HOSPITAL | Age: 66
End: 2021-09-08

## 2021-09-08 ENCOUNTER — TELEPHONE (OUTPATIENT)
Dept: HEMATOLOGY/ONCOLOGY | Facility: HOSPITAL | Age: 66
End: 2021-09-08

## 2021-09-08 DIAGNOSIS — Z79.01 LONG TERM (CURRENT) USE OF ANTICOAGULANTS: ICD-10-CM

## 2021-09-08 LAB — INR: 2.3 (ref 0.8–1.2)

## 2021-09-13 ENCOUNTER — TELEPHONE (OUTPATIENT)
Dept: HEMATOLOGY/ONCOLOGY | Facility: HOSPITAL | Age: 66
End: 2021-09-13

## 2021-09-13 ENCOUNTER — ANTI-COAG VISIT (OUTPATIENT)
Dept: HEMATOLOGY/ONCOLOGY | Facility: HOSPITAL | Age: 66
End: 2021-09-13

## 2021-09-13 DIAGNOSIS — Z79.01 LONG TERM (CURRENT) USE OF ANTICOAGULANTS: ICD-10-CM

## 2021-09-13 LAB — INR: 2.3 (ref 0.8–1.2)

## 2021-09-13 NOTE — TELEPHONE ENCOUNTER
Telephone call to patient with INR results. Patient stated - per your last visit patient saw a Urologist near his home in [place]. He is having a laser procedure on October 13th 2021. Want to know what to do with his Warfarin.       Stated he will sen

## 2021-09-21 ENCOUNTER — ANTI-COAG VISIT (OUTPATIENT)
Dept: HEMATOLOGY/ONCOLOGY | Facility: HOSPITAL | Age: 66
End: 2021-09-21

## 2021-09-21 DIAGNOSIS — Z79.01 LONG TERM (CURRENT) USE OF ANTICOAGULANTS: ICD-10-CM

## 2021-09-21 LAB — INR: 2.4 (ref 0.8–1.2)

## 2021-09-28 ENCOUNTER — ANTI-COAG VISIT (OUTPATIENT)
Dept: HEMATOLOGY/ONCOLOGY | Facility: HOSPITAL | Age: 66
End: 2021-09-28

## 2021-09-28 DIAGNOSIS — Z79.01 LONG TERM (CURRENT) USE OF ANTICOAGULANTS: ICD-10-CM

## 2021-09-28 LAB — INR: 2.5 (ref 0.8–1.2)

## 2021-10-04 ENCOUNTER — ANTI-COAG VISIT (OUTPATIENT)
Dept: HEMATOLOGY/ONCOLOGY | Facility: HOSPITAL | Age: 66
End: 2021-10-04

## 2021-10-04 DIAGNOSIS — Z79.01 LONG TERM (CURRENT) USE OF ANTICOAGULANTS: ICD-10-CM

## 2021-10-11 ENCOUNTER — TELEPHONE (OUTPATIENT)
Dept: HEMATOLOGY/ONCOLOGY | Facility: HOSPITAL | Age: 66
End: 2021-10-11

## 2021-10-11 ENCOUNTER — ANTI-COAG VISIT (OUTPATIENT)
Dept: HEMATOLOGY/ONCOLOGY | Facility: HOSPITAL | Age: 66
End: 2021-10-11

## 2021-10-11 DIAGNOSIS — Z79.01 LONG TERM (CURRENT) USE OF ANTICOAGULANTS: ICD-10-CM

## 2021-10-14 RX ORDER — WARFARIN SODIUM 5 MG/1
TABLET ORAL
Qty: 90 TABLET | Refills: 1 | Status: SHIPPED | OUTPATIENT
Start: 2021-10-14 | End: 2022-04-22

## 2021-10-18 ENCOUNTER — TELEPHONE (OUTPATIENT)
Dept: HEMATOLOGY/ONCOLOGY | Facility: HOSPITAL | Age: 66
End: 2021-10-18

## 2021-10-18 ENCOUNTER — ANTI-COAG VISIT (OUTPATIENT)
Dept: HEMATOLOGY/ONCOLOGY | Facility: HOSPITAL | Age: 66
End: 2021-10-18

## 2021-10-18 DIAGNOSIS — Z79.01 LONG TERM (CURRENT) USE OF ANTICOAGULANTS: ICD-10-CM

## 2021-10-25 ENCOUNTER — ANTI-COAG VISIT (OUTPATIENT)
Dept: HEMATOLOGY/ONCOLOGY | Facility: HOSPITAL | Age: 66
End: 2021-10-25

## 2021-10-25 DIAGNOSIS — Z79.01 LONG TERM (CURRENT) USE OF ANTICOAGULANTS: ICD-10-CM

## 2021-11-01 ENCOUNTER — ANTI-COAG VISIT (OUTPATIENT)
Dept: HEMATOLOGY/ONCOLOGY | Facility: HOSPITAL | Age: 66
End: 2021-11-01

## 2021-11-01 DIAGNOSIS — Z79.01 LONG TERM (CURRENT) USE OF ANTICOAGULANTS: ICD-10-CM

## 2021-11-10 ENCOUNTER — ANTI-COAG VISIT (OUTPATIENT)
Dept: HEMATOLOGY/ONCOLOGY | Facility: HOSPITAL | Age: 66
End: 2021-11-10

## 2021-11-10 DIAGNOSIS — Z79.01 LONG TERM (CURRENT) USE OF ANTICOAGULANTS: ICD-10-CM

## 2021-11-15 ENCOUNTER — ANTI-COAG VISIT (OUTPATIENT)
Dept: HEMATOLOGY/ONCOLOGY | Facility: HOSPITAL | Age: 66
End: 2021-11-15

## 2021-11-15 DIAGNOSIS — Z79.01 LONG TERM (CURRENT) USE OF ANTICOAGULANTS: ICD-10-CM

## 2021-11-23 ENCOUNTER — ANTI-COAG VISIT (OUTPATIENT)
Dept: HEMATOLOGY/ONCOLOGY | Facility: HOSPITAL | Age: 66
End: 2021-11-23

## 2021-11-23 DIAGNOSIS — Z79.01 LONG TERM (CURRENT) USE OF ANTICOAGULANTS: ICD-10-CM

## 2021-11-29 ENCOUNTER — ANTI-COAG VISIT (OUTPATIENT)
Dept: HEMATOLOGY/ONCOLOGY | Facility: HOSPITAL | Age: 66
End: 2021-11-29

## 2021-11-29 DIAGNOSIS — Z79.01 LONG TERM (CURRENT) USE OF ANTICOAGULANTS: ICD-10-CM

## 2021-12-06 ENCOUNTER — ANTI-COAG VISIT (OUTPATIENT)
Dept: HEMATOLOGY/ONCOLOGY | Facility: HOSPITAL | Age: 66
End: 2021-12-06

## 2021-12-06 DIAGNOSIS — Z79.01 LONG TERM (CURRENT) USE OF ANTICOAGULANTS: ICD-10-CM

## 2021-12-21 ENCOUNTER — ANTI-COAG VISIT (OUTPATIENT)
Dept: HEMATOLOGY/ONCOLOGY | Facility: HOSPITAL | Age: 66
End: 2021-12-21

## 2021-12-21 DIAGNOSIS — Z79.01 LONG TERM (CURRENT) USE OF ANTICOAGULANTS: ICD-10-CM

## 2021-12-27 ENCOUNTER — ANTI-COAG VISIT (OUTPATIENT)
Dept: HEMATOLOGY/ONCOLOGY | Facility: HOSPITAL | Age: 66
End: 2021-12-27

## 2021-12-27 DIAGNOSIS — Z79.01 LONG TERM (CURRENT) USE OF ANTICOAGULANTS: ICD-10-CM

## 2022-01-03 ENCOUNTER — ANTI-COAG VISIT (OUTPATIENT)
Dept: HEMATOLOGY/ONCOLOGY | Facility: HOSPITAL | Age: 67
End: 2022-01-03

## 2022-01-03 DIAGNOSIS — Z79.01 LONG TERM (CURRENT) USE OF ANTICOAGULANTS: ICD-10-CM

## 2022-01-03 LAB — INR: 2.3 (ref 0.8–1.2)

## 2022-01-13 ENCOUNTER — ANTI-COAG VISIT (OUTPATIENT)
Dept: HEMATOLOGY/ONCOLOGY | Facility: HOSPITAL | Age: 67
End: 2022-01-13

## 2022-01-13 DIAGNOSIS — Z79.01 LONG TERM (CURRENT) USE OF ANTICOAGULANTS: ICD-10-CM

## 2022-01-13 LAB — INR: 2.5 (ref 0.8–1.2)

## 2022-01-17 ENCOUNTER — ANTI-COAG VISIT (OUTPATIENT)
Dept: HEMATOLOGY/ONCOLOGY | Facility: HOSPITAL | Age: 67
End: 2022-01-17

## 2022-01-17 DIAGNOSIS — Z79.01 LONG TERM (CURRENT) USE OF ANTICOAGULANTS: ICD-10-CM

## 2022-01-17 LAB — INR: 2.3 (ref 0.8–1.2)

## 2022-01-25 ENCOUNTER — ANTI-COAG VISIT (OUTPATIENT)
Dept: HEMATOLOGY/ONCOLOGY | Facility: HOSPITAL | Age: 67
End: 2022-01-25

## 2022-01-25 DIAGNOSIS — Z79.01 LONG TERM (CURRENT) USE OF ANTICOAGULANTS: ICD-10-CM

## 2022-01-25 LAB — INR: 2.7 (ref 0.8–1.2)

## 2022-01-31 ENCOUNTER — ANTI-COAG VISIT (OUTPATIENT)
Dept: HEMATOLOGY/ONCOLOGY | Facility: HOSPITAL | Age: 67
End: 2022-01-31

## 2022-01-31 DIAGNOSIS — Z79.01 LONG TERM (CURRENT) USE OF ANTICOAGULANTS: ICD-10-CM

## 2022-01-31 LAB — INR: 2.7 (ref 0.8–1.2)

## 2022-02-14 ENCOUNTER — ANTI-COAG VISIT (OUTPATIENT)
Dept: HEMATOLOGY/ONCOLOGY | Facility: HOSPITAL | Age: 67
End: 2022-02-14

## 2022-02-14 LAB — INR: 2.6 (ref 0.8–1.2)

## 2022-02-21 ENCOUNTER — ANTI-COAG VISIT (OUTPATIENT)
Dept: HEMATOLOGY/ONCOLOGY | Facility: HOSPITAL | Age: 67
End: 2022-02-21

## 2022-02-22 LAB — INR: 2.6 (ref 0.8–1.2)

## 2022-03-07 ENCOUNTER — ANTI-COAG VISIT (OUTPATIENT)
Dept: HEMATOLOGY/ONCOLOGY | Facility: HOSPITAL | Age: 67
End: 2022-03-07

## 2022-03-07 LAB
INR: 2.6 (ref 0.8–1.2)
INR: 2.7 (ref 0.8–1.2)

## 2022-03-14 ENCOUNTER — ANTI-COAG VISIT (OUTPATIENT)
Dept: HEMATOLOGY/ONCOLOGY | Facility: HOSPITAL | Age: 67
End: 2022-03-14

## 2022-03-14 LAB — INR: 2.4 (ref 0.8–1.2)

## 2022-03-22 ENCOUNTER — ANTI-COAG VISIT (OUTPATIENT)
Dept: HEMATOLOGY/ONCOLOGY | Facility: HOSPITAL | Age: 67
End: 2022-03-22

## 2022-03-22 LAB — INR: 2.8 (ref 0.8–1.2)

## 2022-03-28 ENCOUNTER — ANTI-COAG VISIT (OUTPATIENT)
Dept: HEMATOLOGY/ONCOLOGY | Facility: HOSPITAL | Age: 67
End: 2022-03-28

## 2022-03-28 LAB — INR: 2.4 (ref 0.8–1.2)

## 2022-04-05 ENCOUNTER — ANTI-COAG VISIT (OUTPATIENT)
Dept: HEMATOLOGY/ONCOLOGY | Facility: HOSPITAL | Age: 67
End: 2022-04-05

## 2022-04-05 LAB — INR: 2.5 (ref 0.8–1.2)

## 2022-04-11 ENCOUNTER — ANTI-COAG VISIT (OUTPATIENT)
Dept: HEMATOLOGY/ONCOLOGY | Facility: HOSPITAL | Age: 67
End: 2022-04-11

## 2022-04-11 LAB — INR: 2.7 (ref 0.8–1.2)

## 2022-04-19 ENCOUNTER — ANTI-COAG VISIT (OUTPATIENT)
Dept: HEMATOLOGY/ONCOLOGY | Facility: HOSPITAL | Age: 67
End: 2022-04-19

## 2022-04-19 LAB — INR: 2.8 (ref 0.8–1.2)

## 2022-04-22 RX ORDER — WARFARIN SODIUM 5 MG/1
TABLET ORAL
Qty: 90 TABLET | Refills: 1 | Status: SHIPPED | OUTPATIENT
Start: 2022-04-22 | End: 2023-01-04

## 2022-04-26 ENCOUNTER — ANTI-COAG VISIT (OUTPATIENT)
Dept: HEMATOLOGY/ONCOLOGY | Facility: HOSPITAL | Age: 67
End: 2022-04-26

## 2022-04-26 LAB — INR: 2.7 (ref 0.8–1.2)

## 2022-05-02 ENCOUNTER — ANTI-COAG VISIT (OUTPATIENT)
Dept: HEMATOLOGY/ONCOLOGY | Facility: HOSPITAL | Age: 67
End: 2022-05-02

## 2022-05-02 LAB — INR: 2.8 (ref 0.8–1.2)

## 2022-05-16 ENCOUNTER — ANTI-COAG VISIT (OUTPATIENT)
Dept: HEMATOLOGY/ONCOLOGY | Facility: HOSPITAL | Age: 67
End: 2022-05-16

## 2022-05-16 LAB — INR: 2.6 (ref 0.8–1.2)

## 2022-05-23 ENCOUNTER — ANTI-COAG VISIT (OUTPATIENT)
Dept: HEMATOLOGY/ONCOLOGY | Facility: HOSPITAL | Age: 67
End: 2022-05-23

## 2022-05-23 DIAGNOSIS — Z79.01 LONG TERM (CURRENT) USE OF ANTICOAGULANTS: ICD-10-CM

## 2022-05-23 LAB — INR: 2.7 (ref 0.8–1.2)

## 2022-05-31 ENCOUNTER — TELEPHONE (OUTPATIENT)
Dept: HEMATOLOGY/ONCOLOGY | Facility: HOSPITAL | Age: 67
End: 2022-05-31

## 2022-05-31 ENCOUNTER — ANTI-COAG VISIT (OUTPATIENT)
Dept: HEMATOLOGY/ONCOLOGY | Facility: HOSPITAL | Age: 67
End: 2022-05-31

## 2022-05-31 DIAGNOSIS — Z79.01 LONG TERM (CURRENT) USE OF ANTICOAGULANTS: ICD-10-CM

## 2022-05-31 LAB — INR: 2.4 (ref 0.8–1.2)

## 2022-05-31 NOTE — TELEPHONE ENCOUNTER
Patient called he is going to schedule a colonoscopy. Wants to know the fax number for warfarin instructions -  Lluvia Griffith to fax form to [phone] / phone [phone]  -- Zabrina Curtis / Chari Baptiste RN  Patient notified.

## 2022-06-06 ENCOUNTER — ANTI-COAG VISIT (OUTPATIENT)
Dept: HEMATOLOGY/ONCOLOGY | Facility: HOSPITAL | Age: 67
End: 2022-06-06

## 2022-06-06 DIAGNOSIS — Z79.01 LONG TERM (CURRENT) USE OF ANTICOAGULANTS: ICD-10-CM

## 2022-06-06 LAB — INR: 2.7 (ref 0.8–1.2)

## 2022-06-14 ENCOUNTER — ANTI-COAG VISIT (OUTPATIENT)
Dept: HEMATOLOGY/ONCOLOGY | Facility: HOSPITAL | Age: 67
End: 2022-06-14

## 2022-06-14 DIAGNOSIS — Z79.01 LONG TERM (CURRENT) USE OF ANTICOAGULANTS: ICD-10-CM

## 2022-06-14 LAB — INR: 2.5 (ref 0.8–1.2)

## 2022-06-16 ENCOUNTER — TELEPHONE (OUTPATIENT)
Dept: HEMATOLOGY/ONCOLOGY | Facility: HOSPITAL | Age: 67
End: 2022-06-16

## 2022-06-16 NOTE — TELEPHONE ENCOUNTER
Patient calling. Stated office should have received a fax yesterday  Requesting a coumadin  Release for upcoming colonoscopy.     Please confirm with Patient

## 2022-06-17 ENCOUNTER — TELEPHONE (OUTPATIENT)
Dept: HEMATOLOGY/ONCOLOGY | Facility: HOSPITAL | Age: 67
End: 2022-06-17

## 2022-06-17 NOTE — TELEPHONE ENCOUNTER
Telephone call to patient - notified we did not receive a fax regarding his colonoscopy. Telephone call to his Gastroenterology office at [phone]. Patient requesting clearance and instructions for colonoscopy. Left message for patient to return call. Fax numbers to send clearance forms given. [phone] or [phone]  Attn .

## 2022-06-20 ENCOUNTER — TELEPHONE (OUTPATIENT)
Dept: HEMATOLOGY/ONCOLOGY | Facility: HOSPITAL | Age: 67
End: 2022-06-20

## 2022-06-20 ENCOUNTER — ANTI-COAG VISIT (OUTPATIENT)
Dept: HEMATOLOGY/ONCOLOGY | Facility: HOSPITAL | Age: 67
End: 2022-06-20

## 2022-06-20 DIAGNOSIS — Z79.01 LONG TERM (CURRENT) USE OF ANTICOAGULANTS: ICD-10-CM

## 2022-06-20 LAB — INR: 2.8 (ref 0.8–1.2)

## 2022-06-20 NOTE — TELEPHONE ENCOUNTER
Patient notified we spoke with his GI office. Orders given for holding warfarin prior to procedure. Patient aware. See additional encounter for instruction.

## 2022-06-20 NOTE — TELEPHONE ENCOUNTER
Return call to Marguerite Marrero at [facility]. Patient to hold Warfarin 5 days prior to Colonoscopy procedure. To resume night of procedure. Notified. Stated understanding - they will contact patient.

## 2022-06-20 NOTE — TELEPHONE ENCOUNTER
Marguerite Marrero from [facility] is looking for clearance for Rebeca Diehl for a procedure coming up. Please call and confirm when faxed. Or a verbal clearance is good too. Thank you.

## 2022-07-04 LAB — INR: 2.2 (ref 2–3)

## 2022-07-05 ENCOUNTER — ANTI-COAG VISIT (OUTPATIENT)
Dept: HEMATOLOGY/ONCOLOGY | Facility: HOSPITAL | Age: 67
End: 2022-07-05

## 2022-07-05 DIAGNOSIS — Z79.01 LONG TERM (CURRENT) USE OF ANTICOAGULANTS: ICD-10-CM

## 2022-07-12 ENCOUNTER — ANTI-COAG VISIT (OUTPATIENT)
Dept: HEMATOLOGY/ONCOLOGY | Facility: HOSPITAL | Age: 67
End: 2022-07-12

## 2022-07-12 DIAGNOSIS — Z79.01 LONG TERM (CURRENT) USE OF ANTICOAGULANTS: ICD-10-CM

## 2022-07-12 LAB
INR: 2.4 (ref 0.8–1.2)
INR: 2.6 (ref 0.8–1.2)

## 2022-07-25 ENCOUNTER — ANTI-COAG VISIT (OUTPATIENT)
Dept: HEMATOLOGY/ONCOLOGY | Facility: HOSPITAL | Age: 67
End: 2022-07-25

## 2022-07-25 LAB — INR: 2.4 (ref 0.8–1.2)

## 2022-08-10 ENCOUNTER — ANTI-COAG VISIT (OUTPATIENT)
Dept: HEMATOLOGY/ONCOLOGY | Facility: HOSPITAL | Age: 67
End: 2022-08-10

## 2022-08-10 DIAGNOSIS — Z79.01 LONG TERM (CURRENT) USE OF ANTICOAGULANTS: ICD-10-CM

## 2022-08-10 LAB — INR: 2.5 (ref 0.8–1.2)

## 2022-08-15 ENCOUNTER — ANTI-COAG VISIT (OUTPATIENT)
Dept: HEMATOLOGY/ONCOLOGY | Facility: HOSPITAL | Age: 67
End: 2022-08-15

## 2022-08-15 DIAGNOSIS — Z79.01 LONG TERM (CURRENT) USE OF ANTICOAGULANTS: ICD-10-CM

## 2022-08-15 LAB — INR: 2.5 (ref 0.8–1.2)

## 2022-08-23 ENCOUNTER — ANTI-COAG VISIT (OUTPATIENT)
Dept: HEMATOLOGY/ONCOLOGY | Facility: HOSPITAL | Age: 67
End: 2022-08-23

## 2022-08-23 DIAGNOSIS — Z79.01 LONG TERM (CURRENT) USE OF ANTICOAGULANTS: ICD-10-CM

## 2022-08-23 LAB — INR: 2.3 (ref 0.8–1.2)

## 2022-08-30 LAB — INR: 2.4 (ref 0.8–1.2)

## 2022-09-12 ENCOUNTER — ANTI-COAG VISIT (OUTPATIENT)
Dept: HEMATOLOGY/ONCOLOGY | Facility: HOSPITAL | Age: 67
End: 2022-09-12

## 2022-09-12 DIAGNOSIS — Z79.01 LONG TERM (CURRENT) USE OF ANTICOAGULANTS: ICD-10-CM

## 2022-09-12 LAB — INR: 2.3 (ref 0.8–1.2)

## 2022-09-27 ENCOUNTER — ANTI-COAG VISIT (OUTPATIENT)
Dept: HEMATOLOGY/ONCOLOGY | Facility: HOSPITAL | Age: 67
End: 2022-09-27

## 2022-09-27 DIAGNOSIS — Z79.01 LONG TERM (CURRENT) USE OF ANTICOAGULANTS: ICD-10-CM

## 2022-09-27 LAB — INR: 2.6 (ref 0.8–1.2)

## 2022-10-04 LAB — INR: 2.7 (ref 0.8–1.2)

## 2022-10-10 ENCOUNTER — ANTI-COAG VISIT (OUTPATIENT)
Dept: HEMATOLOGY/ONCOLOGY | Facility: HOSPITAL | Age: 67
End: 2022-10-10

## 2022-10-10 DIAGNOSIS — Z79.01 LONG TERM (CURRENT) USE OF ANTICOAGULANTS: ICD-10-CM

## 2022-10-10 LAB — INR: 2.5 (ref 0.8–1.2)

## 2022-10-17 ENCOUNTER — ANTI-COAG VISIT (OUTPATIENT)
Dept: HEMATOLOGY/ONCOLOGY | Facility: HOSPITAL | Age: 67
End: 2022-10-17

## 2022-10-17 DIAGNOSIS — Z79.01 LONG TERM (CURRENT) USE OF ANTICOAGULANTS: ICD-10-CM

## 2022-10-17 LAB — INR: 2.4 (ref 0.8–1.2)

## 2022-10-31 ENCOUNTER — ANTI-COAG VISIT (OUTPATIENT)
Dept: HEMATOLOGY/ONCOLOGY | Facility: HOSPITAL | Age: 67
End: 2022-10-31

## 2022-10-31 DIAGNOSIS — Z79.01 LONG TERM (CURRENT) USE OF ANTICOAGULANTS: ICD-10-CM

## 2022-10-31 LAB — INR: 2.4 (ref 0.8–1.2)

## 2022-11-14 ENCOUNTER — ANTI-COAG VISIT (OUTPATIENT)
Dept: HEMATOLOGY/ONCOLOGY | Facility: HOSPITAL | Age: 67
End: 2022-11-14

## 2022-11-14 DIAGNOSIS — Z79.01 LONG TERM (CURRENT) USE OF ANTICOAGULANTS: ICD-10-CM

## 2022-11-14 LAB — INR: 2.5 (ref 0.8–1.2)

## 2022-11-22 ENCOUNTER — ANTI-COAG VISIT (OUTPATIENT)
Dept: HEMATOLOGY/ONCOLOGY | Facility: HOSPITAL | Age: 67
End: 2022-11-22

## 2022-11-22 DIAGNOSIS — Z79.01 LONG TERM (CURRENT) USE OF ANTICOAGULANTS: ICD-10-CM

## 2022-11-22 LAB — INR: 2.4 (ref 0.8–1.2)

## 2022-11-28 ENCOUNTER — ANTI-COAG VISIT (OUTPATIENT)
Dept: HEMATOLOGY/ONCOLOGY | Facility: HOSPITAL | Age: 67
End: 2022-11-28

## 2022-11-28 DIAGNOSIS — Z79.01 LONG TERM (CURRENT) USE OF ANTICOAGULANTS: ICD-10-CM

## 2022-11-28 LAB — INR: 2.6 (ref 0.8–1.2)

## 2022-12-05 LAB — INR: 2.7 (ref 0.8–1.2)

## 2022-12-12 ENCOUNTER — ANTI-COAG VISIT (OUTPATIENT)
Dept: HEMATOLOGY/ONCOLOGY | Facility: HOSPITAL | Age: 67
End: 2022-12-12

## 2022-12-12 DIAGNOSIS — Z79.01 LONG TERM (CURRENT) USE OF ANTICOAGULANTS: ICD-10-CM

## 2022-12-12 LAB — INR: 2.4 (ref 0.8–1.2)

## 2022-12-19 LAB — INR: 2.6 (ref 0.8–1.2)

## 2022-12-28 LAB — INR: 2.5 (ref 0.8–1.2)

## 2023-01-02 LAB — INR: 2.8 (ref 0.8–1.2)

## 2023-01-04 RX ORDER — WARFARIN SODIUM 5 MG/1
5 TABLET ORAL DAILY
Qty: 90 TABLET | Refills: 1 | Status: SHIPPED | OUTPATIENT
Start: 2023-01-04 | End: 2023-04-03 | Stop reason: HOSPADM

## 2023-01-09 ENCOUNTER — ANTI-COAG VISIT (OUTPATIENT)
Dept: HEMATOLOGY/ONCOLOGY | Facility: HOSPITAL | Age: 68
End: 2023-01-09

## 2023-01-09 DIAGNOSIS — Z79.01 LONG TERM (CURRENT) USE OF ANTICOAGULANTS: ICD-10-CM

## 2023-01-09 LAB — INR: 2.5 (ref 0.8–1.2)

## 2023-01-15 NOTE — ED NOTES
Third pad saturated - new pad change;pt updated on inpt poc. Additional labs sent. Pt denies pain/weakness/dizziness. Family remains at bs. no

## 2023-01-16 ENCOUNTER — ANTI-COAG VISIT (OUTPATIENT)
Dept: HEMATOLOGY/ONCOLOGY | Facility: HOSPITAL | Age: 68
End: 2023-01-16

## 2023-01-16 DIAGNOSIS — Z79.01 LONG TERM (CURRENT) USE OF ANTICOAGULANTS: ICD-10-CM

## 2023-01-16 LAB — INR: 2.3 (ref 0.8–1.2)

## 2023-01-23 ENCOUNTER — ANTI-COAG VISIT (OUTPATIENT)
Dept: HEMATOLOGY/ONCOLOGY | Facility: HOSPITAL | Age: 68
End: 2023-01-23

## 2023-01-23 DIAGNOSIS — Z79.01 LONG TERM (CURRENT) USE OF ANTICOAGULANTS: ICD-10-CM

## 2023-01-23 LAB — INR: 2.7 (ref 0.8–1.2)

## 2023-02-13 ENCOUNTER — ANTI-COAG VISIT (OUTPATIENT)
Dept: HEMATOLOGY/ONCOLOGY | Facility: HOSPITAL | Age: 68
End: 2023-02-13

## 2023-02-13 DIAGNOSIS — Z79.01 LONG TERM (CURRENT) USE OF ANTICOAGULANTS: ICD-10-CM

## 2023-02-13 LAB — INR: 2.7 (ref 0.8–1.2)

## 2023-02-21 LAB — INR: 2.3 (ref 0.8–1.2)

## 2023-02-22 ENCOUNTER — ANTI-COAG VISIT (OUTPATIENT)
Dept: HEMATOLOGY/ONCOLOGY | Facility: HOSPITAL | Age: 68
End: 2023-02-22

## 2023-02-22 DIAGNOSIS — Z79.01 LONG TERM (CURRENT) USE OF ANTICOAGULANTS: ICD-10-CM

## 2023-03-06 ENCOUNTER — ANTI-COAG VISIT (OUTPATIENT)
Dept: HEMATOLOGY/ONCOLOGY | Facility: HOSPITAL | Age: 68
End: 2023-03-06

## 2023-03-06 DIAGNOSIS — Z79.01 LONG TERM (CURRENT) USE OF ANTICOAGULANTS: ICD-10-CM

## 2023-03-06 LAB — INR: 2.8 (ref 0.8–1.2)

## 2023-04-03 ENCOUNTER — ANTI-COAG VISIT (OUTPATIENT)
Dept: HEMATOLOGY/ONCOLOGY | Facility: HOSPITAL | Age: 68
End: 2023-04-03

## 2023-04-03 DIAGNOSIS — Z79.01 LONG TERM (CURRENT) USE OF ANTICOAGULANTS: ICD-10-CM

## 2023-04-03 LAB — INR: 2.8 (ref 0.8–1.2)

## 2023-04-03 RX ORDER — WARFARIN SODIUM 5 MG/1
5 TABLET ORAL DAILY
Qty: 90 TABLET | Refills: 0 | Status: SHIPPED | OUTPATIENT
Start: 2023-04-03

## 2023-04-10 ENCOUNTER — ANTI-COAG VISIT (OUTPATIENT)
Dept: HEMATOLOGY/ONCOLOGY | Facility: HOSPITAL | Age: 68
End: 2023-04-10

## 2023-04-10 DIAGNOSIS — Z79.01 LONG TERM (CURRENT) USE OF ANTICOAGULANTS: ICD-10-CM

## 2023-04-10 LAB — INR: 2.9 (ref 0.8–1.2)

## 2023-04-17 ENCOUNTER — ANTI-COAG VISIT (OUTPATIENT)
Dept: HEMATOLOGY/ONCOLOGY | Facility: HOSPITAL | Age: 68
End: 2023-04-17

## 2023-04-17 DIAGNOSIS — Z79.01 LONG TERM (CURRENT) USE OF ANTICOAGULANTS: ICD-10-CM

## 2023-04-17 LAB — INR: 2.9 (ref 0.8–1.2)

## 2023-04-25 ENCOUNTER — ANTI-COAG VISIT (OUTPATIENT)
Dept: HEMATOLOGY/ONCOLOGY | Facility: HOSPITAL | Age: 68
End: 2023-04-25

## 2023-04-25 LAB — INR: 2.2 (ref 0.8–1.2)

## 2023-05-01 ENCOUNTER — ANTI-COAG VISIT (OUTPATIENT)
Dept: HEMATOLOGY/ONCOLOGY | Facility: HOSPITAL | Age: 68
End: 2023-05-01

## 2023-05-01 DIAGNOSIS — Z79.01 LONG TERM (CURRENT) USE OF ANTICOAGULANTS: ICD-10-CM

## 2023-05-01 LAB — INR: 2.3 (ref 0.8–1.2)

## 2023-05-15 ENCOUNTER — ANTI-COAG VISIT (OUTPATIENT)
Dept: HEMATOLOGY/ONCOLOGY | Facility: HOSPITAL | Age: 68
End: 2023-05-15

## 2023-05-15 DIAGNOSIS — Z79.01 LONG TERM (CURRENT) USE OF ANTICOAGULANTS: ICD-10-CM

## 2023-05-15 LAB — INR: 2.4 (ref 0.8–1.2)

## 2023-05-23 ENCOUNTER — ANTI-COAG VISIT (OUTPATIENT)
Dept: HEMATOLOGY/ONCOLOGY | Facility: HOSPITAL | Age: 68
End: 2023-05-23

## 2023-05-23 DIAGNOSIS — Z79.01 LONG TERM (CURRENT) USE OF ANTICOAGULANTS: ICD-10-CM

## 2023-05-23 LAB — INR: 2.2 (ref 0.8–1.2)

## 2023-06-05 ENCOUNTER — ANTI-COAG VISIT (OUTPATIENT)
Dept: HEMATOLOGY/ONCOLOGY | Facility: HOSPITAL | Age: 68
End: 2023-06-05

## 2023-06-05 DIAGNOSIS — Z79.01 LONG TERM (CURRENT) USE OF ANTICOAGULANTS: ICD-10-CM

## 2023-06-05 LAB — INR: 2.8 (ref 0.8–1.2)

## 2023-06-12 ENCOUNTER — ANTI-COAG VISIT (OUTPATIENT)
Dept: HEMATOLOGY/ONCOLOGY | Facility: HOSPITAL | Age: 68
End: 2023-06-12

## 2023-06-12 DIAGNOSIS — Z79.01 LONG TERM (CURRENT) USE OF ANTICOAGULANTS: Primary | ICD-10-CM

## 2023-06-13 LAB — INR: 2.5 (ref 0.8–1.2)

## 2023-06-21 ENCOUNTER — ANTI-COAG VISIT (OUTPATIENT)
Dept: HEMATOLOGY/ONCOLOGY | Facility: HOSPITAL | Age: 68
End: 2023-06-21

## 2023-06-21 DIAGNOSIS — Z79.01 LONG TERM (CURRENT) USE OF ANTICOAGULANTS: ICD-10-CM

## 2023-06-21 LAB — INR: 2.8 (ref 0.8–1.2)

## 2023-06-26 ENCOUNTER — ANTI-COAG VISIT (OUTPATIENT)
Dept: HEMATOLOGY/ONCOLOGY | Facility: HOSPITAL | Age: 68
End: 2023-06-26

## 2023-06-26 DIAGNOSIS — Z79.01 LONG TERM (CURRENT) USE OF ANTICOAGULANTS: Primary | ICD-10-CM

## 2023-06-26 LAB — INR: 2.5 (ref 0.8–1.2)

## 2023-07-03 ENCOUNTER — ANTI-COAG VISIT (OUTPATIENT)
Dept: HEMATOLOGY/ONCOLOGY | Facility: HOSPITAL | Age: 68
End: 2023-07-03

## 2023-07-03 DIAGNOSIS — Z79.01 LONG TERM (CURRENT) USE OF ANTICOAGULANTS: Primary | ICD-10-CM

## 2023-07-03 LAB — INR: 2.4 (ref 0.8–1.2)

## 2023-07-10 ENCOUNTER — ANTI-COAG VISIT (OUTPATIENT)
Dept: HEMATOLOGY/ONCOLOGY | Facility: HOSPITAL | Age: 68
End: 2023-07-10

## 2023-07-10 DIAGNOSIS — Z79.01 LONG TERM (CURRENT) USE OF ANTICOAGULANTS: Primary | ICD-10-CM

## 2023-07-10 LAB — INR: 2.6 (ref 0.8–1.2)

## 2023-07-24 ENCOUNTER — ANTI-COAG VISIT (OUTPATIENT)
Dept: HEMATOLOGY/ONCOLOGY | Facility: HOSPITAL | Age: 68
End: 2023-07-24

## 2023-07-24 ENCOUNTER — TELEPHONE (OUTPATIENT)
Dept: HEMATOLOGY/ONCOLOGY | Facility: HOSPITAL | Age: 68
End: 2023-07-24

## 2023-07-24 DIAGNOSIS — Z79.01 LONG TERM (CURRENT) USE OF ANTICOAGULANTS: Primary | ICD-10-CM

## 2023-07-24 LAB — INR: 2.6 (ref 0.8–1.2)

## 2023-07-24 RX ORDER — WARFARIN SODIUM 5 MG/1
5 TABLET ORAL DAILY
Qty: 30 TABLET | Refills: 0 | Status: SHIPPED | OUTPATIENT
Start: 2023-07-24

## 2023-07-24 NOTE — TELEPHONE ENCOUNTER
Spoke with patient notified last visit was June of 2021 - he requires a follow up appointment for additional refills. Patient stated he will call to schedule appointment for follow up - we will issue one month supply of warfarin. Clothing

## 2023-08-07 ENCOUNTER — ANTI-COAG VISIT (OUTPATIENT)
Dept: HEMATOLOGY/ONCOLOGY | Facility: HOSPITAL | Age: 68
End: 2023-08-07

## 2023-08-07 DIAGNOSIS — Z79.01 LONG TERM (CURRENT) USE OF ANTICOAGULANTS: Primary | ICD-10-CM

## 2023-08-07 LAB — INR: 2.7 (ref 0.8–1.2)

## 2023-08-14 ENCOUNTER — ANTI-COAG VISIT (OUTPATIENT)
Dept: HEMATOLOGY/ONCOLOGY | Facility: HOSPITAL | Age: 68
End: 2023-08-14

## 2023-08-14 DIAGNOSIS — Z79.01 LONG TERM (CURRENT) USE OF ANTICOAGULANTS: Primary | ICD-10-CM

## 2023-08-14 LAB — INR: 2.8 (ref 0.8–1.2)

## 2023-08-21 ENCOUNTER — ANTI-COAG VISIT (OUTPATIENT)
Dept: HEMATOLOGY/ONCOLOGY | Facility: HOSPITAL | Age: 68
End: 2023-08-21

## 2023-08-21 DIAGNOSIS — Z79.01 LONG TERM (CURRENT) USE OF ANTICOAGULANTS: Primary | ICD-10-CM

## 2023-08-21 LAB — INR: 2.6 (ref 0.8–1.2)

## 2023-12-29 ENCOUNTER — TELEPHONE (OUTPATIENT)
Dept: HEMATOLOGY/ONCOLOGY | Facility: HOSPITAL | Age: 68
End: 2023-12-29

## 2024-01-15 ENCOUNTER — ANTI-COAG VISIT (OUTPATIENT)
Dept: HEMATOLOGY/ONCOLOGY | Facility: HOSPITAL | Age: 69
End: 2024-01-15

## 2024-01-15 DIAGNOSIS — Z79.01 LONG TERM (CURRENT) USE OF ANTICOAGULANTS: Primary | ICD-10-CM

## (undated) DIAGNOSIS — Z79.01 LONG TERM (CURRENT) USE OF ANTICOAGULANTS: ICD-10-CM

## (undated) DEVICE — BREAST-HERNIA-PORT CDS-LF: Brand: MEDLINE INDUSTRIES, INC.

## (undated) DEVICE — PAD ULNAR NERVE PROTECTOR

## (undated) DEVICE — SUTURE VICRYL 5-0 PC-1

## (undated) DEVICE — STERILE POLYISOPRENE POWDER-FREE SURGICAL GLOVES: Brand: PROTEXIS

## (undated) DEVICE — VIOLET BRAIDED (POLYGLACTIN 910), SYNTHETIC ABSORBABLE SUTURE: Brand: COATED VICRYL

## (undated) DEVICE — KENDALL SCD EXPRESS SLEEVES, KNEE LENGTH, MEDIUM: Brand: KENDALL SCD

## (undated) DEVICE — CHLORAPREP 26ML APPLICATOR

## (undated) DEVICE — ABDOMINAL PAD: Brand: DERMACEA

## (undated) DEVICE — PREMIUM WET SKIN PREP TRAY: Brand: MEDLINE INDUSTRIES, INC.

## (undated) DEVICE — UNDYED BRAIDED (POLYGLACTIN 910), SYNTHETIC ABSORBABLE SUTURE: Brand: COATED VICRYL

## (undated) DEVICE — 1840 FOAM BLOCK NEEDLE COUNTER: Brand: DEVON

## (undated) DEVICE — RECTAL CDS-LF: Brand: MEDLINE INDUSTRIES, INC.

## (undated) DEVICE — SOL  .9 1000ML BTL

## (undated) DEVICE — 3M(TM) MICROPORE TAPE DISPENSER 1535-2: Brand: 3M™ MICROPORE™

## (undated) DEVICE — GLOVE BIOGEL M SURG SZ 71/2

## (undated) DEVICE — GOWN,SIRUS,FABRIC-REINFORCED,X-LARGE: Brand: MEDLINE

## (undated) DEVICE — STRYKER HARMONIC 23CM REPRCSED

## (undated) DEVICE — BRIEF INCONTINENCE ADULT 2XL

## (undated) DEVICE — GAUZE SPONGES,USP TYPE VII GAUZE, 12 PLY: Brand: CURITY

## (undated) DEVICE — STANDARD HYPODERMIC NEEDLE,POLYPROPYLENE HUB: Brand: MONOJECT

## (undated) DEVICE — SUTURE VICRYL 4-0 PC-1

## (undated) DEVICE — BETADINE SOLUTION 8 OZ BTL

## (undated) DEVICE — MINI LAP PACK-LF: Brand: MEDLINE INDUSTRIES, INC.

## (undated) DEVICE — DRAIN RELIAVAC 100CC

## (undated) DEVICE — SUTURE VICRYL 3-0 CT-2

## (undated) NOTE — MR AVS SNAPSHOT
After Visit Summary   5/12/2017    [facility] Notice    MRN: [id number]           Diagnoses this Visit     Long term (current) use of anticoagulants    -  Primary     Polycythemia         Recurrent pulmonary embolism (HCC)         Thrombocytopenia (H Call [phone] for help. [employer] is NOT to be used for urgent needs. For medical emergencies, dial 911.

## (undated) NOTE — LETTER
[facility]  [address] [facility], [address] [place] [address]    Consent for Operation    Date: __________________    Time: _______________    1.  I authorize the performance upon Alvina Wisdom the following operation:    Procedure(s):  RECTAL EXAM UNDER A procedure has been videotaped, the surgeon will obtain the original videotape. The hospital will not be responsible for storage or maintenance of this tape.     6. For the purpose of advancing medical education, I consent to the admittance of observers to t STATEMENTS REQUIRING INSERTION OR COMPLETION WERE FILLED IN.     Signature of Patient:   ___________________________    When the patient is a minor or mentally incompetent to give consent:  Signature of person authorized to consent for patient: ____________ drugs/illegal medications). Failure to inform my anesthesiologist about these medicines may increase my risk of anesthetic complications. · If I am allergic to anything or have had a reaction to anesthesia before.     3. I understand how the anesthesia med I have discussed the procedure and information above with the patient (or patient’s representative) and answered their questions. The patient or their representative has agreed to have anesthesia services.     _______________________________________________

## (undated) NOTE — Clinical Note
2017    Patient: Jacinto Dean  : 1955 Visit date: 4/3/2017    Dear  Dr. Iliana Guido, DO,    Thank you for referring Jacinto Dean to my practice. Please find my assessment and plan below.              Results    CT ABDOMEN+PELVIS(CPT=74176 outside of the hernia. This finding can be acute or chronic. The hernia neck measures 10 mm.     The appendix is normal. Descending colon and sigmoid diverticula are observed with no findings of  active inflammation. Prostate gland diameter is 5.0 cm.  Semi CONCLUSION:  Fat-containing left inguinal hernia.            Dictated by: Pauline Quintero MD on 3/24/2017 at 13:09        Approved by: Pauline Quintero MD               Assessment   Umbilical hernia without obstruction and without gangrene  (primary encounter Past surgical history significant for an ACL repair as well as the above-mentioned bone marrow biopsy. No other surgery, including abdominal surgery. Medication list is significant for Coumadin as noted above. He has no known allergies.   He has no smo operation were fully discussed with the patient. All questions from the patient were answered in detail. A description of the procedure and its possible outcomes was fully discussed. The patient seemed to understand the conversation and its details.  Juanita

## (undated) NOTE — LETTER
10/31/17    Patient: Cherise Pennington  : 1955 Visit date: 10/31/2017    Dear  Dr. Liudmila Brady, DO,    Thank you for referring Cherise Pennington to my practice. Please find my assessment and plan below.              Assessment   Umbilical hernia with obs

## (undated) NOTE — Clinical Note
Nora Fenton M.D., F.A.C.S.,F.A.C.R.S.     Surgical Clearance Needed    Date: 4/3/2017                                                                       From: Paras Blair    Attn: Lalo Mcnamara

## (undated) NOTE — IP AVS SNAPSHOT
[facility] [place] [address] [place], [address] ~ [phone]                Discharge Summary   6/23/2017    Jonathan Mcnally           Admission Information        Provider Department    6/23/2017 Michael Tavera MD  Mandie Martin / Mamadou Saleh [    ]       Warfarin Sodium 5 MG Tabs   Commonly known as:  COUMADIN        TAKE 1-2 TABLETS BY MOUTH EVERY DAY AS DIRECTED BY COUMADIN CLINIC.     Dk Shaikh     [    ]    [    ]    [    ]    [    ]         STOP taking these medications     PE dose.  These should be no alcohol consumption in the immediate recovery time period or within six hours of taking narcotics. WOUND CARE  Any dressings should be removed the day after surgery.   This includes the gauze, tape, and band-aids if they are pre hours. Patients should be off narcotics for at least 8 hours prior to being the . The average time off work is three to five days, but up to 10 days is sometimes necessary for jobs that include significant physical exertion.   Patients may go up and · Sometimes after surgery; you don't feel like eating normally  · If you don't drink water; you could get dehydrated    Alcoholic beverages should be avoided for:  · 24 hours after Anesthesia/Sedation    Call the doctor for:  · Elevated Temperature  · Blee HgbA1C Glucose BUN Creatinine Calcium Alkaline Phosph AST    -- (06/15/17)  103 (H) (06/15/17)  16 (06/15/17)  1.31 (H) (06/15/17)  9.4 (06/15/17)  98 (06/15/17)  36      Metabolic Lab Results  (Last result in the past 90 days)    ALT Bilirubin,Total [place] office, you can view your past visit information in [facility] by going to Visits < Visit Summaries. [employer] questions? Call [phone] for help. [employer] is NOT to be used for urgent needs. For medical emergencies, dial 911.

## (undated) NOTE — MR AVS SNAPSHOT
[address]  [address] [facility], [place] [id number] [phone]               Thank you for choosing us for your health care visit with Jacinda Dumont DO.   We are glad to serve you and happy to provide you with this beasley Fish Oil 1200 MG Caps   Take 1 capsule by mouth daily. MAGNESIUM OR   Take 1 tablet by mouth daily.            PEG 3350-KCl-Na Bicarb-NaCl 420 g Solr   Starting at 4:00 pm the night before procedure, drink 8 ounces of the prep every 15-20 minutes

## (undated) NOTE — LETTER
July 14, 2017        Dk Ngo      Dear Nicky Saul:    I am the Nurse Care Manager with Dr. Geremias Herrmann office. Just reaching out to see how you are doing since your discharge home.    When you have a minute woul

## (undated) NOTE — LETTER
17    Patient: Oswaldo Delcid  : 1955 Visit date: 2017    Dear  Dr. Eleni Claude, DO,    Thank you for referring Oswaldo Delcid to my practice. Please find my assessment and plan below.       Assessment   Benign neoplasm of anal canal  (p

## (undated) NOTE — LETTER
[facility] Testing Department  Phone: [phone]  Right Fax: [phone]  [facility] [address] By:  Beverley Ga RN Date: 17    Patient Name: Aiyana Salinas  Surgery Date: 2017    CSN: [id number]  Medical Record: [id number]

## (undated) NOTE — LETTER
Last Revised 02/07/06  Obstructive Sleep Apnea Questionnaire    Clinical signs and symptoms suggesting the possibility of LEANDRA    1. Predisposing physical characteristics (positive with any of the following present)  ? BMI 35kg/m²  ?  Craniofacial abnormalit pauses which are frightening to the observer, patient regularly falls asleep within minutes after being left unstimulated) in which case they should be treated as though they have severe sleep apnea.     The sleep laboratory’s assessment (none, mild, modera Point Total for B           C. Requirement for postoperative opioids.                Opioid requirement             Points   None 0    Low dose oral opiod 1    High dose oral opioids, parenteral or neuraxial opiods 3      Point Total for C        Estimation

## (undated) NOTE — LETTER
17    Patient: Brannon Hayden  : 1955 Visit date: 2017    Dear  Dr. Aron Lozano DO,    Thank you for referring Brannon Hayden to my practice. Please find my assessment and plan below.            Assessment   Benign neoplasm of anal canal I will see the patient again in 3 years for a follow-up colonoscopy based on his history of previous colon polyps.               Sincerely,       Ember Curry MD   CC: No Recipients

## (undated) NOTE — MR AVS SNAPSHOT
[address]  [address] [id number] [phone]               Thank you for choosing us for your health care visit with Elsie Amador DO.   We are glad to serve you and happy to provide you with this beasley Assoc Dx: Benign non-nodular prostatic hyperplasia, presence of lower urinary tract symptoms unspecified [N40.0]                 Scheduling Instructions     Friday March 17, 2017     Imaging:  Juan Prather LEFT CZ(XLW=[id number])    Instructions:   To schedule an Umbilical hernia with obstruction, without gangrene    Annual physical exam    -  Primary    Colon cancer screening          Instructions and Information about Your Health     None      Allergies as of Mar 17, 2017     No Known Allergies                To Enjoy your food, but eat less. Fully enjoy your food when eating. Don’t eat while distracted and slow down. Avoid over sized portions. Don’t eat while when you’re bored.      EAT THESE FOODS MORE OFTEN: EAT THESE FOODS LESS OFTEN:   Make half your pl

## (undated) NOTE — ED AVS SNAPSHOT
Melissa Moura   MRN: [id number]    Department:  [facility] Emergency Department   Date of Visit:  5/20/2018           Disclosure     Insurance plans vary and the physician(s) referred by the ER may not be covered by your plan.  Please contact yo tell this physician (or your personal doctor if your instructions are to return to your personal doctor) about any new or lasting problems. The primary care or specialist physician will see patients referred from the [facility] Emergency Department.  Karen Yanes

## (undated) NOTE — MR AVS SNAPSHOT
[facility]  [address], [address] [address] [phone]               Thank you for choosing us for your health care visit with Purnima Monte MD.  We are glad to serve you and happy to provide you with this summary of you sweats, abdominal pain, diarrhea, constipation, blood/mucus in the stool, or dark/tarry stool. The patient does complain of a left groin lump which has been developing for the last 2 years. The patient denies any pain associated with this lump.   He was approximately 2-3 cm in diameter. This is nontender with no overlying erythema. The patient also has a large growth within the left groin adjacent to the scrotum.   This does not appear to represent a hernia, but rather a soft tissue lesion measuring appr intravenous contrast are administered.  Coronal and sagittal reformatted images of the abdomen and  pelvis are also evaluated.     Automated exposure control and ALARA manual techniques for patient specific dose reduction were  followed while maintaining th   Final result (3/24/2017  1:14 PM) Reviewed        Study Result      PROCEDURE:  ULTRASOUND OF THE LEFT GROIN      COMPARISON:  [facility] , CT CHST,ABD,PLV W/CONT SET, 9/02/2011, 10:22.      INDICATIONS:  R19.09 Other intra-abdominal and pelvic swelling, mas tamsulosin HCl 0.4 MG Caps   Take 1 capsule (0.4 mg total) by mouth daily.    Commonly known as:  FLOMAX           Warfarin Sodium 5 MG Tabs   TAKE 1 TO 2 TABLETS BY MOUTH EVERY DAY AS DIRECTED BY COUMADIN CLINIC   Commonly known as:  COUMADIN

## (undated) NOTE — MR AVS SNAPSHOT
[address]  [address] [facility], [address] [address]  [phone]               Thank you for choosing us for your health care visit with Doris Wu DO.   We are glad to serve you and happy to provide you with this What changed:  additional instructions   Commonly known as:  TRILYTE           POTASSIUM OR   Take 1 tablet by mouth daily. SUPER B COMPLEX OR   Take 1 tablet by mouth daily.            tamsulosin HCl 0.4 MG Caps   Take 1 capsule (0.4 mg total) by day (2.4 g sodium or 6 g sodium chloride)   Aerobic physical activity Regular aerobic physical activity (e.g., brisk walking, light jogging, cycling, swimming, etc.) for a goal of at least 150 minutes per week.    Moderation of alcohol consumption Men: Leroy Euceda